# Patient Record
Sex: FEMALE | Race: WHITE | NOT HISPANIC OR LATINO | Employment: OTHER | URBAN - NONMETROPOLITAN AREA
[De-identification: names, ages, dates, MRNs, and addresses within clinical notes are randomized per-mention and may not be internally consistent; named-entity substitution may affect disease eponyms.]

---

## 2023-03-15 PROBLEM — J45.30 MILD PERSISTENT ASTHMA WITHOUT COMPLICATION (HHS-HCC): Status: ACTIVE | Noted: 2023-03-15

## 2023-03-15 PROBLEM — R10.9 ABDOMINAL PAIN: Status: ACTIVE | Noted: 2023-03-15

## 2023-03-15 PROBLEM — J18.9 PNEUMONIA: Status: ACTIVE | Noted: 2023-03-15

## 2023-03-15 PROBLEM — K44.9 HIATAL HERNIA: Status: ACTIVE | Noted: 2023-03-15

## 2023-03-15 PROBLEM — S22.39XA RIB FRACTURE: Status: ACTIVE | Noted: 2023-03-15

## 2023-03-15 PROBLEM — E87.1 HYPONATREMIA: Status: ACTIVE | Noted: 2023-03-15

## 2023-03-15 PROBLEM — K63.5 COLON POLYPS: Status: ACTIVE | Noted: 2023-03-15

## 2023-03-15 PROBLEM — M67.441 GANGLION, FINGER OF RIGHT HAND: Status: ACTIVE | Noted: 2023-03-15

## 2023-03-15 PROBLEM — R53.83 FATIGUE: Status: ACTIVE | Noted: 2023-03-15

## 2023-03-15 PROBLEM — M67.431 GANGLION OF RIGHT WRIST: Status: ACTIVE | Noted: 2023-03-15

## 2023-03-15 PROBLEM — U07.1 COVID-19: Status: ACTIVE | Noted: 2023-03-15

## 2023-03-15 PROBLEM — R10.32 LEFT LOWER QUADRANT ABDOMINAL PAIN: Status: ACTIVE | Noted: 2023-03-15

## 2023-03-15 PROBLEM — M25.531 RIGHT WRIST PAIN: Status: ACTIVE | Noted: 2023-03-15

## 2023-03-15 PROBLEM — R00.2 HEART PALPITATIONS: Status: ACTIVE | Noted: 2023-03-15

## 2023-03-15 PROBLEM — D64.9 ANEMIA: Status: ACTIVE | Noted: 2023-03-15

## 2023-03-15 PROBLEM — R05.9 COUGH: Status: ACTIVE | Noted: 2023-03-15

## 2023-03-15 PROBLEM — R06.02 SOB (SHORTNESS OF BREATH) ON EXERTION: Status: ACTIVE | Noted: 2023-03-15

## 2023-03-15 PROBLEM — R20.0 NUMBNESS: Status: ACTIVE | Noted: 2023-03-15

## 2023-03-15 RX ORDER — CHOLECALCIFEROL (VITAMIN D3) 25 MCG
2 TABLET ORAL DAILY
COMMUNITY
Start: 2021-09-16 | End: 2024-03-01 | Stop reason: ALTCHOICE

## 2023-03-15 RX ORDER — BECLOMETHASONE DIPROPIONATE HFA 80 UG/1
80 AEROSOL, METERED RESPIRATORY (INHALATION)
COMMUNITY
Start: 2022-09-27 | End: 2024-03-01 | Stop reason: ALTCHOICE

## 2023-03-15 RX ORDER — FLUTICASONE PROPIONATE 110 UG/1
2 AEROSOL, METERED RESPIRATORY (INHALATION) 2 TIMES DAILY
COMMUNITY
Start: 2022-09-27 | End: 2024-03-01 | Stop reason: ALTCHOICE

## 2023-03-15 RX ORDER — ALBUTEROL SULFATE 90 UG/1
1 AEROSOL, METERED RESPIRATORY (INHALATION) 2 TIMES DAILY
COMMUNITY
Start: 2021-08-31 | End: 2024-03-01 | Stop reason: ALTCHOICE

## 2023-03-21 ENCOUNTER — OFFICE VISIT (OUTPATIENT)
Dept: PRIMARY CARE | Facility: CLINIC | Age: 68
End: 2023-03-21
Payer: COMMERCIAL

## 2023-03-21 ENCOUNTER — TELEPHONE (OUTPATIENT)
Dept: PRIMARY CARE | Facility: CLINIC | Age: 68
End: 2023-03-21

## 2023-03-21 VITALS
WEIGHT: 230.6 LBS | HEART RATE: 116 BPM | SYSTOLIC BLOOD PRESSURE: 152 MMHG | DIASTOLIC BLOOD PRESSURE: 86 MMHG | BODY MASS INDEX: 39.37 KG/M2 | HEIGHT: 64 IN | OXYGEN SATURATION: 98 %

## 2023-03-21 DIAGNOSIS — N30.00 ACUTE CYSTITIS WITHOUT HEMATURIA: ICD-10-CM

## 2023-03-21 DIAGNOSIS — N94.9 ADNEXAL CYST: Primary | ICD-10-CM

## 2023-03-21 LAB
POC APPEARANCE, URINE: CLEAR
POC BILIRUBIN, URINE: NEGATIVE
POC BLOOD, URINE: NEGATIVE
POC COLOR, URINE: YELLOW
POC GLUCOSE, URINE: NEGATIVE MG/DL
POC KETONES, URINE: NEGATIVE MG/DL
POC LEUKOCYTES, URINE: ABNORMAL
POC NITRITE,URINE: NEGATIVE
POC PH, URINE: 6 PH
POC PROTEIN, URINE: NEGATIVE MG/DL
POC SPECIFIC GRAVITY, URINE: 1.02
POC UROBILINOGEN, URINE: 0.2 EU/DL

## 2023-03-21 PROCEDURE — 99214 OFFICE O/P EST MOD 30 MIN: CPT | Performed by: FAMILY MEDICINE

## 2023-03-21 PROCEDURE — 1159F MED LIST DOCD IN RCRD: CPT | Performed by: FAMILY MEDICINE

## 2023-03-21 PROCEDURE — 1036F TOBACCO NON-USER: CPT | Performed by: FAMILY MEDICINE

## 2023-03-21 PROCEDURE — 81003 URINALYSIS AUTO W/O SCOPE: CPT | Performed by: FAMILY MEDICINE

## 2023-03-21 RX ORDER — SULFAMETHOXAZOLE AND TRIMETHOPRIM 800; 160 MG/1; MG/1
1 TABLET ORAL 2 TIMES DAILY
Qty: 14 TABLET | Refills: 0 | Status: SHIPPED | OUTPATIENT
Start: 2023-03-21 | End: 2023-03-28

## 2023-03-21 ASSESSMENT — ENCOUNTER SYMPTOMS
DIFFICULTY URINATING: 0
CONSTIPATION: 0
ABDOMINAL PAIN: 0
NAUSEA: 0
FREQUENCY: 1
HEMATURIA: 0
FATIGUE: 0
DYSURIA: 0
DIARRHEA: 0

## 2023-03-21 ASSESSMENT — PATIENT HEALTH QUESTIONNAIRE - PHQ9
SUM OF ALL RESPONSES TO PHQ9 QUESTIONS 1 AND 2: 0
2. FEELING DOWN, DEPRESSED OR HOPELESS: NOT AT ALL
1. LITTLE INTEREST OR PLEASURE IN DOING THINGS: NOT AT ALL

## 2023-03-21 NOTE — PROGRESS NOTES
"Subjective   Patient ID: Eloisa Boogie is a 67 y.o. female who presents for Chronic Kidney Disease (F/U).    HPI   She had urinary frequency and burning and went to the ER.  At that time they were worried about kidney stone and did a CT.  CT did not show any kidney stone, but multiple cysts on both kidneys, changes to the left adnexal area, hiatal hernia, diverticular disease without diverticulitis, possible epiploic appendagitis.  Since the ER she is feeling better.    Remotely had a hysterectomy and her right ovary removed.    Urinalysis today - no blood, small leuks.    Still with some urinary frequency will add Bactrim for 1 week.    We will order a pelvic ultrasound for the changes to the left adnexal area.  Recommend she follow-up with her GYN doctor for regular checkup the adnexal changes.  She is going to call the GYN office for visit.    Review of Systems   Constitutional:  Negative for fatigue.   Gastrointestinal:  Negative for abdominal pain, constipation, diarrhea and nausea.   Genitourinary:  Positive for frequency. Negative for difficulty urinating, dysuria and hematuria.       Objective   /86   Pulse (!) 116   Ht 1.626 m (5' 4\")   Wt 105 kg (230 lb 9.6 oz)   LMP  (LMP Unknown)   SpO2 98%   BMI 39.58 kg/m²     Physical Exam  General:  Alert and oriented, No acute distress.         Ambulation status: With steady gait.         Appearance: Well nourished, Calm.         Behavior: Cooperative.    Integumentary:  Warm, Dry, Pink, Intact.    Psychiatric:  Cooperative, Appropriate mood & affect, Normal judgment.      Assessment/Plan   Problem List Items Addressed This Visit          Genitourinary    Adnexal cyst - Primary    Relevant Orders    US pelvis     Other Visit Diagnoses       Acute cystitis without hematuria        Relevant Medications    sulfamethoxazole-trimethoprim (Bactrim DS) 800-160 mg tablet    Other Relevant Orders    POCT UA Automated manually resulted               "

## 2023-03-21 NOTE — TELEPHONE ENCOUNTER
SCHEDULED US FOR PELVIS FOR 3/28/23 AT [facility] ARRIVAL AT 9:30.  DRINK 32 OZ WATER 1 HR PRIOR TO PROCEDURE AND DON'T URINATE. PATIENT ADVISED.

## 2023-03-28 ENCOUNTER — TELEPHONE (OUTPATIENT)
Dept: PRIMARY CARE | Facility: CLINIC | Age: 68
End: 2023-03-28
Payer: COMMERCIAL

## 2023-03-28 NOTE — TELEPHONE ENCOUNTER
----- Message from Jose Taylor MD sent at 3/28/2023  4:38 PM EDT -----  Notify her that the pelvic ultrasound shows she has a cyst on the ovary.  I still recommend she follow-up with the GYN doctor about the cyst and for a general check up.

## 2023-03-28 NOTE — RESULT ENCOUNTER NOTE
Notify her that the pelvic ultrasound shows she has a cyst on the ovary.  I still recommend she follow-up with the GYN doctor about the cyst and for a general check up.

## 2023-08-07 ENCOUNTER — APPOINTMENT (OUTPATIENT)
Dept: PRIMARY CARE | Facility: CLINIC | Age: 68
End: 2023-08-07
Payer: COMMERCIAL

## 2023-09-05 PROBLEM — H25.811 COMBINED FORMS OF AGE-RELATED CATARACT OF RIGHT EYE: Status: ACTIVE | Noted: 2020-01-03

## 2023-09-05 PROBLEM — H25.812 COMBINED FORM OF AGE-RELATED CATARACT, LEFT EYE: Status: ACTIVE | Noted: 2020-01-03

## 2023-09-05 PROBLEM — Z87.42 HISTORY OF OVARIAN CYST: Status: ACTIVE | Noted: 2023-09-05

## 2023-09-05 PROBLEM — M67.431 GANGLION OF RIGHT WRIST: Status: ACTIVE | Noted: 2023-09-05

## 2023-09-05 PROBLEM — M79.606 LEG PAIN: Status: ACTIVE | Noted: 2023-09-05

## 2023-09-05 PROBLEM — H04.123 DRY EYE SYNDROME OF BOTH EYES: Status: ACTIVE | Noted: 2020-01-03

## 2023-09-05 PROBLEM — M67.449 GANGLION CYST OF FINGER: Status: ACTIVE | Noted: 2023-09-05

## 2023-09-05 PROBLEM — H35.3131 EARLY DRY STAGE NONEXUDATIVE AGE-RELATED MACULAR DEGENERATION OF BOTH EYES: Status: ACTIVE | Noted: 2020-01-03

## 2023-09-05 RX ORDER — PHENAZOPYRIDINE HYDROCHLORIDE 100 MG/1
1 TABLET, FILM COATED ORAL 3 TIMES DAILY
COMMUNITY
Start: 2023-03-12 | End: 2024-03-01 | Stop reason: ALTCHOICE

## 2023-09-05 RX ORDER — OXYCODONE HYDROCHLORIDE 5 MG/1
1 TABLET ORAL EVERY 6 HOURS PRN
COMMUNITY
Start: 2022-09-21 | End: 2024-03-01 | Stop reason: ALTCHOICE

## 2023-09-05 RX ORDER — ONDANSETRON 4 MG/1
4 TABLET, ORALLY DISINTEGRATING ORAL EVERY 6 HOURS PRN
COMMUNITY
Start: 2023-03-12 | End: 2024-03-01 | Stop reason: ALTCHOICE

## 2023-09-05 RX ORDER — ACETAMINOPHEN 325 MG/1
325-650 TABLET ORAL EVERY 4 HOURS PRN
COMMUNITY
End: 2024-03-01 | Stop reason: ALTCHOICE

## 2023-09-05 RX ORDER — ONDANSETRON HYDROCHLORIDE 8 MG/1
4 TABLET, FILM COATED ORAL EVERY 8 HOURS PRN
COMMUNITY
Start: 2021-08-30 | End: 2024-03-01 | Stop reason: ALTCHOICE

## 2023-09-05 RX ORDER — IBUPROFEN 200 MG
1-2 TABLET ORAL EVERY 6 HOURS PRN
COMMUNITY
Start: 2013-12-03 | End: 2024-03-01 | Stop reason: ALTCHOICE

## 2023-09-05 RX ORDER — ALBUTEROL SULFATE 1.25 MG/3ML
3 SOLUTION RESPIRATORY (INHALATION) EVERY 6 HOURS PRN
COMMUNITY
Start: 2021-09-16 | End: 2024-03-01 | Stop reason: ALTCHOICE

## 2023-09-08 ENCOUNTER — CLINICAL SUPPORT (OUTPATIENT)
Dept: PRIMARY CARE | Facility: CLINIC | Age: 68
End: 2023-09-08
Payer: COMMERCIAL

## 2023-09-08 DIAGNOSIS — R30.0 DYSURIA: ICD-10-CM

## 2023-09-08 DIAGNOSIS — N30.00 ACUTE CYSTITIS WITHOUT HEMATURIA: Primary | ICD-10-CM

## 2023-09-08 LAB
POC APPEARANCE, URINE: CLEAR
POC BILIRUBIN, URINE: NEGATIVE
POC BLOOD, URINE: NEGATIVE
POC COLOR, URINE: YELLOW
POC GLUCOSE, URINE: NEGATIVE MG/DL
POC KETONES, URINE: NEGATIVE MG/DL
POC LEUKOCYTES, URINE: NEGATIVE
POC NITRITE,URINE: NEGATIVE
POC PH, URINE: 6 PH
POC PROTEIN, URINE: NEGATIVE MG/DL
POC SPECIFIC GRAVITY, URINE: 1.02
POC UROBILINOGEN, URINE: 0.2 EU/DL

## 2023-09-08 PROCEDURE — 81003 URINALYSIS AUTO W/O SCOPE: CPT | Performed by: FAMILY MEDICINE

## 2023-09-08 RX ORDER — NITROFURANTOIN 25; 75 MG/1; MG/1
100 CAPSULE ORAL 2 TIMES DAILY
Qty: 10 CAPSULE | Refills: 1 | Status: SHIPPED | OUTPATIENT
Start: 2023-09-08 | End: 2023-09-13

## 2023-09-08 NOTE — PROGRESS NOTES
Patient brought a urine sample into the office today for dysuria. Jesusita ran urine test strip for POCT UA. Results forwarded to PCP.

## 2023-09-11 DIAGNOSIS — J45.30 MILD PERSISTENT ASTHMA WITHOUT COMPLICATION (HHS-HCC): Primary | ICD-10-CM

## 2023-09-11 RX ORDER — ALBUTEROL SULFATE 90 UG/1
2 AEROSOL, METERED RESPIRATORY (INHALATION) EVERY 6 HOURS PRN
Qty: 54 G | Refills: 3 | Status: SHIPPED | OUTPATIENT
Start: 2023-09-11 | End: 2024-09-10

## 2023-09-11 RX ORDER — ALBUTEROL SULFATE 90 UG/1
2 AEROSOL, METERED RESPIRATORY (INHALATION) EVERY 6 HOURS PRN
COMMUNITY
End: 2023-09-11 | Stop reason: SDUPTHER

## 2023-09-11 RX ORDER — ALBUTEROL SULFATE 90 UG/1
2 AEROSOL, METERED RESPIRATORY (INHALATION) EVERY 6 HOURS PRN
COMMUNITY
End: 2024-03-01 | Stop reason: ALTCHOICE

## 2023-09-12 ENCOUNTER — TELEPHONE (OUTPATIENT)
Dept: PRIMARY CARE | Facility: CLINIC | Age: 68
End: 2023-09-12
Payer: COMMERCIAL

## 2023-09-12 DIAGNOSIS — U07.1 COVID-19: Primary | ICD-10-CM

## 2023-09-12 RX ORDER — NIRMATRELVIR AND RITONAVIR 300-100 MG
3 KIT ORAL 2 TIMES DAILY
Qty: 5 DOSE PACK | Refills: 0 | Status: SHIPPED | OUTPATIENT
Start: 2023-09-12 | End: 2023-09-17

## 2023-09-12 NOTE — TELEPHONE ENCOUNTER
PATIENT CALLED YESTERDAY, STARTED WITH CO VID SYMPTOMS SATURDAY/   TESTED POSITIVE 9-11-23.   WOULD LIKE [facility]OVID SENT TO [facility].   PLEASE

## 2023-09-19 ENCOUNTER — APPOINTMENT (OUTPATIENT)
Dept: PRIMARY CARE | Facility: CLINIC | Age: 68
End: 2023-09-19
Payer: COMMERCIAL

## 2023-10-04 ENCOUNTER — HOSPITAL ENCOUNTER (OUTPATIENT)
Dept: RADIOLOGY | Facility: HOSPITAL | Age: 68
Discharge: HOME | End: 2023-10-04
Payer: COMMERCIAL

## 2023-10-04 DIAGNOSIS — Z87.42 PERSONAL HISTORY OF OTHER DISEASES OF THE FEMALE GENITAL TRACT: ICD-10-CM

## 2023-10-04 PROCEDURE — 76830 TRANSVAGINAL US NON-OB: CPT

## 2023-10-04 PROCEDURE — 76856 US EXAM PELVIC COMPLETE: CPT

## 2023-10-04 PROCEDURE — 76856 US EXAM PELVIC COMPLETE: CPT | Performed by: RADIOLOGY

## 2023-10-04 PROCEDURE — 76830 TRANSVAGINAL US NON-OB: CPT | Performed by: RADIOLOGY

## 2023-10-10 DIAGNOSIS — N83.209 CYST OF OVARY, UNSPECIFIED LATERALITY: Primary | ICD-10-CM

## 2023-10-11 ENCOUNTER — APPOINTMENT (OUTPATIENT)
Dept: OBSTETRICS AND GYNECOLOGY | Facility: CLINIC | Age: 68
End: 2023-10-11
Payer: COMMERCIAL

## 2024-03-01 ENCOUNTER — LAB (OUTPATIENT)
Dept: LAB | Facility: LAB | Age: 69
End: 2024-03-01
Payer: COMMERCIAL

## 2024-03-01 ENCOUNTER — OFFICE VISIT (OUTPATIENT)
Dept: PRIMARY CARE | Facility: CLINIC | Age: 69
End: 2024-03-01
Payer: COMMERCIAL

## 2024-03-01 VITALS
HEIGHT: 64 IN | WEIGHT: 235.8 LBS | OXYGEN SATURATION: 99 % | BODY MASS INDEX: 40.26 KG/M2 | HEART RATE: 109 BPM | SYSTOLIC BLOOD PRESSURE: 138 MMHG | DIASTOLIC BLOOD PRESSURE: 94 MMHG

## 2024-03-01 DIAGNOSIS — J45.30 MILD PERSISTENT ASTHMA WITHOUT COMPLICATION (HHS-HCC): ICD-10-CM

## 2024-03-01 DIAGNOSIS — Z00.00 ROUTINE GENERAL MEDICAL EXAMINATION AT HEALTH CARE FACILITY: Primary | ICD-10-CM

## 2024-03-01 DIAGNOSIS — D50.8 OTHER IRON DEFICIENCY ANEMIA: ICD-10-CM

## 2024-03-01 PROBLEM — R53.83 FATIGUE: Status: RESOLVED | Noted: 2023-03-15 | Resolved: 2024-03-01

## 2024-03-01 PROBLEM — R10.32 LEFT LOWER QUADRANT ABDOMINAL PAIN: Status: RESOLVED | Noted: 2023-03-15 | Resolved: 2024-03-01

## 2024-03-01 PROBLEM — R00.2 HEART PALPITATIONS: Status: RESOLVED | Noted: 2023-03-15 | Resolved: 2024-03-01

## 2024-03-01 PROBLEM — R20.0 NUMBNESS: Status: RESOLVED | Noted: 2023-03-15 | Resolved: 2024-03-01

## 2024-03-01 PROBLEM — J18.9 PNEUMONIA: Status: RESOLVED | Noted: 2023-03-15 | Resolved: 2024-03-01

## 2024-03-01 PROBLEM — R06.02 SOB (SHORTNESS OF BREATH) ON EXERTION: Status: RESOLVED | Noted: 2023-03-15 | Resolved: 2024-03-01

## 2024-03-01 PROBLEM — U07.1 COVID-19: Status: RESOLVED | Noted: 2023-03-15 | Resolved: 2024-03-01

## 2024-03-01 PROBLEM — R10.9 ABDOMINAL PAIN: Status: RESOLVED | Noted: 2023-03-15 | Resolved: 2024-03-01

## 2024-03-01 PROBLEM — E87.1 HYPONATREMIA: Status: RESOLVED | Noted: 2023-03-15 | Resolved: 2024-03-01

## 2024-03-01 PROBLEM — R05.9 COUGH: Status: RESOLVED | Noted: 2023-03-15 | Resolved: 2024-03-01

## 2024-03-01 LAB
ALBUMIN SERPL BCP-MCNC: 4.1 G/DL (ref 3.4–5)
ALP SERPL-CCNC: 86 U/L (ref 33–136)
ALT SERPL W P-5'-P-CCNC: 13 U/L (ref 7–45)
ANION GAP SERPL CALC-SCNC: 12 MMOL/L (ref 10–20)
AST SERPL W P-5'-P-CCNC: 15 U/L (ref 9–39)
BILIRUB SERPL-MCNC: 0.3 MG/DL (ref 0–1.2)
BUN SERPL-MCNC: 17 MG/DL (ref 6–23)
CALCIUM SERPL-MCNC: 10.1 MG/DL (ref 8.6–10.3)
CHLORIDE SERPL-SCNC: 107 MMOL/L (ref 98–107)
CO2 SERPL-SCNC: 27 MMOL/L (ref 21–32)
CREAT SERPL-MCNC: 0.63 MG/DL (ref 0.5–1.05)
EGFRCR SERPLBLD CKD-EPI 2021: >90 ML/MIN/1.73M*2
ERYTHROCYTE [DISTWIDTH] IN BLOOD BY AUTOMATED COUNT: 18.6 % (ref 11.5–14.5)
FERRITIN SERPL-MCNC: 24 NG/ML (ref 8–150)
GLUCOSE SERPL-MCNC: 100 MG/DL (ref 74–99)
HCT VFR BLD AUTO: 34.5 % (ref 36–46)
HGB BLD-MCNC: 9.7 G/DL (ref 12–16)
IRON SATN MFR SERPL: 4 % (ref 25–45)
IRON SERPL-MCNC: 15 UG/DL (ref 35–150)
MCH RBC QN AUTO: 21.8 PG (ref 26–34)
MCHC RBC AUTO-ENTMCNC: 28.1 G/DL (ref 32–36)
MCV RBC AUTO: 78 FL (ref 80–100)
NRBC BLD-RTO: 0 /100 WBCS (ref 0–0)
PLATELET # BLD AUTO: 344 X10*3/UL (ref 150–450)
POTASSIUM SERPL-SCNC: 4.7 MMOL/L (ref 3.5–5.3)
PROT SERPL-MCNC: 6.6 G/DL (ref 6.4–8.2)
RBC # BLD AUTO: 4.45 X10*6/UL (ref 4–5.2)
SODIUM SERPL-SCNC: 141 MMOL/L (ref 136–145)
TIBC SERPL-MCNC: 428 UG/DL (ref 240–445)
UIBC SERPL-MCNC: 413 UG/DL (ref 110–370)
VIT B12 SERPL-MCNC: 849 PG/ML (ref 211–911)
WBC # BLD AUTO: 9 X10*3/UL (ref 4.4–11.3)

## 2024-03-01 PROCEDURE — 1160F RVW MEDS BY RX/DR IN RCRD: CPT | Performed by: FAMILY MEDICINE

## 2024-03-01 PROCEDURE — 82728 ASSAY OF FERRITIN: CPT

## 2024-03-01 PROCEDURE — 36415 COLL VENOUS BLD VENIPUNCTURE: CPT

## 2024-03-01 PROCEDURE — G0439 PPPS, SUBSEQ VISIT: HCPCS | Performed by: FAMILY MEDICINE

## 2024-03-01 PROCEDURE — 90677 PCV20 VACCINE IM: CPT | Performed by: FAMILY MEDICINE

## 2024-03-01 PROCEDURE — 80053 COMPREHEN METABOLIC PANEL: CPT

## 2024-03-01 PROCEDURE — 99214 OFFICE O/P EST MOD 30 MIN: CPT | Performed by: FAMILY MEDICINE

## 2024-03-01 PROCEDURE — 82607 VITAMIN B-12: CPT

## 2024-03-01 PROCEDURE — G0009 ADMIN PNEUMOCOCCAL VACCINE: HCPCS | Performed by: FAMILY MEDICINE

## 2024-03-01 PROCEDURE — 1159F MED LIST DOCD IN RCRD: CPT | Performed by: FAMILY MEDICINE

## 2024-03-01 PROCEDURE — 83540 ASSAY OF IRON: CPT

## 2024-03-01 PROCEDURE — 1170F FXNL STATUS ASSESSED: CPT | Performed by: FAMILY MEDICINE

## 2024-03-01 PROCEDURE — 1036F TOBACCO NON-USER: CPT | Performed by: FAMILY MEDICINE

## 2024-03-01 PROCEDURE — 85027 COMPLETE CBC AUTOMATED: CPT

## 2024-03-01 PROCEDURE — 83550 IRON BINDING TEST: CPT

## 2024-03-01 RX ORDER — FLUTICASONE FUROATE 200 UG/1
1 POWDER RESPIRATORY (INHALATION) DAILY
Qty: 1 EACH | Refills: 11 | Status: SHIPPED | OUTPATIENT
Start: 2024-03-01 | End: 2024-03-28 | Stop reason: ALTCHOICE

## 2024-03-01 RX ORDER — FLUTICASONE PROPIONATE 110 UG/1
2 AEROSOL, METERED RESPIRATORY (INHALATION) 2 TIMES DAILY
Qty: 12 G | Refills: 11 | Status: CANCELLED | OUTPATIENT
Start: 2024-03-01 | End: 2025-03-01

## 2024-03-01 ASSESSMENT — ACTIVITIES OF DAILY LIVING (ADL)
TAKING_MEDICATION: INDEPENDENT
DOING_HOUSEWORK: INDEPENDENT
GROCERY_SHOPPING: INDEPENDENT
MANAGING_FINANCES: INDEPENDENT
BATHING: INDEPENDENT
DRESSING: INDEPENDENT

## 2024-03-01 ASSESSMENT — ENCOUNTER SYMPTOMS
HEADACHES: 0
SHORTNESS OF BREATH: 1
CONSTIPATION: 0
LOSS OF SENSATION IN FEET: 0
ABDOMINAL PAIN: 0
COUGH: 1
DIARRHEA: 0
FATIGUE: 0
PALPITATIONS: 1
OCCASIONAL FEELINGS OF UNSTEADINESS: 0
CHEST TIGHTNESS: 0

## 2024-03-01 NOTE — PROGRESS NOTES
Subjective   Reason for Visit: Eloisa Boogie is an 68 y.o. female here for a Medicare Wellness visit.     Past Medical, Surgical, and Family History reviewed and updated in chart.    Reviewed all medications by prescribing practitioner or clinical pharmacist (such as prescriptions, OTCs, herbal therapies and supplements) and documented in the medical record.    HPI  Exam she has multiple mild side effects from combined inhalers like Advair or Symbicort.  Sometimes palpitations sometimes bladder irritation.  But overall she feels like her breathing is getting worse, and she definitely feels better when she uses at least the steroid inhaler.  She is wondering if another medicine besides the albuterol inhaler would help, like the nebulizer solution and DuoNeb.  Worried about those medications being stronger causing her heart palpitations.  According to the computer working to try Flovent but annuity Ellipta is covered better.  So we will start that 1 puff once a day swish and spit swish and swallow.  Continue as needed albuterol  Office visit 6 weeks  Prevnar 20 shot done today.  Recommend yearly flu shot.  Colonoscopy 2022, polyps, 5 years.  Mammogram 2022    Still taking iron, had iron infusions from Dr. Lazcano.  Will follow-up blood test.  Will check a CBC etc. today.    Patient Care Team:  Jose Taylor MD as PCP - General  Jose Taylor MD as PCP - [employer] PCP  Jose Taylor MD as PCP - [employer] PCP     Review of Systems   Constitutional:  Negative for fatigue.   Eyes:  Negative for visual disturbance.   Respiratory:  Positive for cough and shortness of breath. Negative for chest tightness.    Cardiovascular:  Positive for palpitations. Negative for chest pain.   Gastrointestinal:  Negative for abdominal pain, constipation and diarrhea.   Skin:  Negative for rash.   Neurological:  Negative for headaches.       Objective   Vitals:  BP (!) 138/94   Pulse 109  "  Ht 1.626 m (5' 4\")   Wt 107 kg (235 lb 12.8 oz)   LMP  (LMP Unknown)   SpO2 99%   BMI 40.47 kg/m²       Physical Exam  Constitutional:       Appearance: Normal appearance.   HENT:      Head: Normocephalic and atraumatic.   Cardiovascular:      Rate and Rhythm: Normal rate and regular rhythm.      Heart sounds: Normal heart sounds.   Pulmonary:      Effort: Pulmonary effort is normal.      Breath sounds: Decreased air movement present. Wheezing and rhonchi present.   Skin:     General: Skin is warm and dry.   Neurological:      General: No focal deficit present.      Mental Status: She is alert and oriented to person, place, and time.   Psychiatric:         Mood and Affect: Mood normal.         Behavior: Behavior normal.         Thought Content: Thought content normal.         Judgment: Judgment normal.         Assessment/Plan   Problem List Items Addressed This Visit       Anemia    Relevant Orders    CBC    Comprehensive Metabolic Panel    Ferritin    Iron and TIBC    Vitamin B12    Mild persistent asthma without complication    Relevant Medications    fluticasone furoate (Arnuity Ellipta) 200 mcg/actuation inhaler     Other Visit Diagnoses       Routine general medical examination at health care facility    -  Primary                 "

## 2024-03-04 ENCOUNTER — TELEPHONE (OUTPATIENT)
Dept: PRIMARY CARE | Facility: CLINIC | Age: 69
End: 2024-03-04
Payer: COMMERCIAL

## 2024-03-04 DIAGNOSIS — D50.8 OTHER IRON DEFICIENCY ANEMIA: Primary | ICD-10-CM

## 2024-03-04 NOTE — TELEPHONE ENCOUNTER
----- Message from Jose Taylor MD sent at 3/4/2024  8:29 AM EST -----  Basic labs are okay, but she is anemic again.  Since he is taking the iron every day and still anemic, I recommend we will refer her to a blood doctor/hematology.  Is she okay with that?  If yes, we will put the order in.

## 2024-03-04 NOTE — RESULT ENCOUNTER NOTE
Basic labs are okay, but she is anemic again.  Since he is taking the iron every day and still anemic, I recommend we will refer her to a blood doctor/hematology.  Is she okay with that?  If yes, we will put the order in.

## 2024-03-06 NOTE — TELEPHONE ENCOUNTER
PT CALLED IN TO VERIFY WHEN HER APPT IS WITH HEM[facility]ONC WITH [facility] . ADVISED HER OF THAT INFORMATION.

## 2024-03-15 ENCOUNTER — OFFICE VISIT (OUTPATIENT)
Dept: HEMATOLOGY/ONCOLOGY | Facility: CLINIC | Age: 69
End: 2024-03-15
Payer: COMMERCIAL

## 2024-03-15 VITALS
TEMPERATURE: 96.4 F | DIASTOLIC BLOOD PRESSURE: 90 MMHG | HEART RATE: 121 BPM | RESPIRATION RATE: 20 BRPM | BODY MASS INDEX: 41.25 KG/M2 | OXYGEN SATURATION: 98 % | HEIGHT: 64 IN | WEIGHT: 241.6 LBS | SYSTOLIC BLOOD PRESSURE: 140 MMHG

## 2024-03-15 DIAGNOSIS — E61.1 IRON DEFICIENCY: Primary | ICD-10-CM

## 2024-03-15 DIAGNOSIS — D50.8 OTHER IRON DEFICIENCY ANEMIA: ICD-10-CM

## 2024-03-15 LAB
ALBUMIN SERPL BCP-MCNC: 3.8 G/DL (ref 3.4–5)
ALP SERPL-CCNC: 88 U/L (ref 33–136)
ALT SERPL W P-5'-P-CCNC: 15 U/L (ref 7–45)
ANION GAP SERPL CALC-SCNC: 10 MMOL/L (ref 10–20)
AST SERPL W P-5'-P-CCNC: 14 U/L (ref 9–39)
BASOPHILS # BLD AUTO: 0.07 X10*3/UL (ref 0–0.1)
BASOPHILS NFR BLD AUTO: 0.7 %
BILIRUB SERPL-MCNC: 0.2 MG/DL (ref 0–1.2)
BUN SERPL-MCNC: 19 MG/DL (ref 6–23)
CALCIUM SERPL-MCNC: 9.9 MG/DL (ref 8.6–10.3)
CHLORIDE SERPL-SCNC: 108 MMOL/L (ref 98–107)
CO2 SERPL-SCNC: 25 MMOL/L (ref 21–32)
CREAT SERPL-MCNC: 0.67 MG/DL (ref 0.5–1.05)
EGFRCR SERPLBLD CKD-EPI 2021: >90 ML/MIN/1.73M*2
EOSINOPHIL # BLD AUTO: 0.13 X10*3/UL (ref 0–0.7)
EOSINOPHIL NFR BLD AUTO: 1.3 %
ERYTHROCYTE [DISTWIDTH] IN BLOOD BY AUTOMATED COUNT: 19.5 % (ref 11.5–14.5)
FERRITIN SERPL-MCNC: 21 NG/ML (ref 8–150)
FOLATE SERPL-MCNC: 13.1 NG/ML
GLUCOSE SERPL-MCNC: 154 MG/DL (ref 74–99)
HCT VFR BLD AUTO: 32.6 % (ref 36–46)
HGB BLD-MCNC: 9.4 G/DL (ref 12–16)
HGB RETIC QN: 27 PG (ref 28–38)
IMM GRANULOCYTES # BLD AUTO: 0.05 X10*3/UL (ref 0–0.7)
IMM GRANULOCYTES NFR BLD AUTO: 0.5 % (ref 0–0.9)
IMMATURE RETIC FRACTION: 40.3 %
IRON SATN MFR SERPL: 3 % (ref 25–45)
IRON SERPL-MCNC: 15 UG/DL (ref 35–150)
LDH SERPL L TO P-CCNC: 136 U/L (ref 84–246)
LYMPHOCYTES # BLD AUTO: 2.17 X10*3/UL (ref 1.2–4.8)
LYMPHOCYTES NFR BLD AUTO: 21.5 %
MCH RBC QN AUTO: 22 PG (ref 26–34)
MCHC RBC AUTO-ENTMCNC: 28.8 G/DL (ref 32–36)
MCV RBC AUTO: 76 FL (ref 80–100)
MONOCYTES # BLD AUTO: 0.78 X10*3/UL (ref 0.1–1)
MONOCYTES NFR BLD AUTO: 7.7 %
NEUTROPHILS # BLD AUTO: 6.91 X10*3/UL (ref 1.2–7.7)
NEUTROPHILS NFR BLD AUTO: 68.3 %
NRBC BLD-RTO: 0 /100 WBCS (ref 0–0)
PLATELET # BLD AUTO: 317 X10*3/UL (ref 150–450)
POTASSIUM SERPL-SCNC: 3.9 MMOL/L (ref 3.5–5.3)
PROT SERPL-MCNC: 6.4 G/DL (ref 6.4–8.2)
RBC # BLD AUTO: 4.28 X10*6/UL (ref 4–5.2)
RETICS #: 0.09 X10*6/UL (ref 0.02–0.11)
RETICS/RBC NFR AUTO: 2.2 % (ref 0.5–2)
SODIUM SERPL-SCNC: 139 MMOL/L (ref 136–145)
TIBC SERPL-MCNC: 455 UG/DL (ref 240–445)
TSH SERPL-ACNC: 1.64 MIU/L (ref 0.44–3.98)
UIBC SERPL-MCNC: 440 UG/DL (ref 110–370)
VIT B12 SERPL-MCNC: 884 PG/ML (ref 211–911)
WBC # BLD AUTO: 10.1 X10*3/UL (ref 4.4–11.3)

## 2024-03-15 PROCEDURE — 82746 ASSAY OF FOLIC ACID SERUM: CPT

## 2024-03-15 PROCEDURE — 1159F MED LIST DOCD IN RCRD: CPT

## 2024-03-15 PROCEDURE — 82728 ASSAY OF FERRITIN: CPT

## 2024-03-15 PROCEDURE — 84443 ASSAY THYROID STIM HORMONE: CPT

## 2024-03-15 PROCEDURE — 85045 AUTOMATED RETICULOCYTE COUNT: CPT

## 2024-03-15 PROCEDURE — 1160F RVW MEDS BY RX/DR IN RCRD: CPT

## 2024-03-15 PROCEDURE — 1126F AMNT PAIN NOTED NONE PRSNT: CPT

## 2024-03-15 PROCEDURE — 99205 OFFICE O/P NEW HI 60 MIN: CPT

## 2024-03-15 PROCEDURE — 36415 COLL VENOUS BLD VENIPUNCTURE: CPT

## 2024-03-15 PROCEDURE — 83010 ASSAY OF HAPTOGLOBIN QUANT: CPT

## 2024-03-15 PROCEDURE — 82607 VITAMIN B-12: CPT

## 2024-03-15 PROCEDURE — 83615 LACTATE (LD) (LDH) ENZYME: CPT

## 2024-03-15 PROCEDURE — 80053 COMPREHEN METABOLIC PANEL: CPT

## 2024-03-15 PROCEDURE — 99215 OFFICE O/P EST HI 40 MIN: CPT

## 2024-03-15 PROCEDURE — 83540 ASSAY OF IRON: CPT

## 2024-03-15 PROCEDURE — 85025 COMPLETE CBC W/AUTO DIFF WBC: CPT

## 2024-03-15 RX ORDER — HEPARIN SODIUM,PORCINE/PF 10 UNIT/ML
50 SYRINGE (ML) INTRAVENOUS AS NEEDED
Status: CANCELLED | OUTPATIENT
Start: 2024-03-15

## 2024-03-15 RX ORDER — LANOLIN ALCOHOL/MO/W.PET/CERES
250 CREAM (GRAM) TOPICAL DAILY
COMMUNITY

## 2024-03-15 RX ORDER — DIPHENHYDRAMINE HYDROCHLORIDE 50 MG/ML
50 INJECTION INTRAMUSCULAR; INTRAVENOUS AS NEEDED
Status: CANCELLED | OUTPATIENT
Start: 2024-03-15

## 2024-03-15 RX ORDER — HEPARIN 100 UNIT/ML
500 SYRINGE INTRAVENOUS AS NEEDED
Status: CANCELLED | OUTPATIENT
Start: 2024-03-15

## 2024-03-15 RX ORDER — FAMOTIDINE 10 MG/ML
20 INJECTION INTRAVENOUS ONCE AS NEEDED
Status: CANCELLED | OUTPATIENT
Start: 2024-03-15

## 2024-03-15 RX ORDER — ALBUTEROL SULFATE 0.83 MG/ML
3 SOLUTION RESPIRATORY (INHALATION) AS NEEDED
Status: CANCELLED | OUTPATIENT
Start: 2024-03-15

## 2024-03-15 RX ORDER — EPINEPHRINE 0.3 MG/.3ML
0.3 INJECTION SUBCUTANEOUS EVERY 5 MIN PRN
Status: CANCELLED | OUTPATIENT
Start: 2024-03-15

## 2024-03-15 ASSESSMENT — COLUMBIA-SUICIDE SEVERITY RATING SCALE - C-SSRS
2. HAVE YOU ACTUALLY HAD ANY THOUGHTS OF KILLING YOURSELF?: NO
6. HAVE YOU EVER DONE ANYTHING, STARTED TO DO ANYTHING, OR PREPARED TO DO ANYTHING TO END YOUR LIFE?: NO
1. IN THE PAST MONTH, HAVE YOU WISHED YOU WERE DEAD OR WISHED YOU COULD GO TO SLEEP AND NOT WAKE UP?: NO

## 2024-03-15 ASSESSMENT — PATIENT HEALTH QUESTIONNAIRE - PHQ9
SUM OF ALL RESPONSES TO PHQ9 QUESTIONS 1 AND 2: 0
1. LITTLE INTEREST OR PLEASURE IN DOING THINGS: NOT AT ALL
2. FEELING DOWN, DEPRESSED OR HOPELESS: NOT AT ALL

## 2024-03-15 ASSESSMENT — ENCOUNTER SYMPTOMS
DEPRESSION: 0
LOSS OF SENSATION IN FEET: 0
OCCASIONAL FEELINGS OF UNSTEADINESS: 0

## 2024-03-15 ASSESSMENT — PAIN SCALES - GENERAL: PAINLEVEL: 0-NO PAIN

## 2024-03-15 NOTE — PROGRESS NOTES
Patient ID: Eloisa Boogie is a 68 y.o. female.  Referring Physician: Jose Taylor MD  [address] [id number]  Primary Care Provider: Jose Taylor MD  Visit Type: Initial Visit      Subjective    HPI    Patient is a 69 yo female with a PMH of  and was referred to benign hematology for consultation of Anemia.    Recent lab work revealed her hg remains low. Patient reports she has struggle with anemia and iron deficiency since her hip replacement in 2016. Patient had a colonoscopy in 2023 to rule out GI bleeding. Coloscopy revealed two polyps, otherwise no significant findings. GI ordered iron infusions, and then started her on oral iron and B12 in hopes to manage iron parameters.      Today, patient presents for initial consultation. Appetite is decent for patient, 1-2 meals on average. Energy is poor/fatigues easily. Since having COVID in 2022  has been struggling with rapid heart beat and increased VELASCO.No abnormal bleeding or bruising. No GI issues. Denies any hematochezia or melena. No urinary issues, no UTIs, dysuria, hematuria. No fevers, night sweats, weight is stable. No recurring infections. No bone pain. Episodes of lightheadedness, and dizziness. Great dentition, left raised rash near elbow, dry skin and hair. No PICA.       up to date on cancer screenings    SH: Diet - reg, Tobacco-None, ETOH-None, Rec drug use-None, Worked [employer], 2 adult children   PSH:   PAT, Several orthopedic surgeries, Left hip replacement, Left arm pins and plates  PMH: Asthma  FH:  Mother- liver cancer  Father- Hodkins lymphoma  Sister- Met. Breast Cancer- late 50's    Meds: see list   Review of Systems   Constitutional:  Positive for fatigue.   HENT:  Negative.     Eyes: Negative.    Respiratory:  Positive for shortness of breath.    Cardiovascular:  Positive for palpitations.   Gastrointestinal: Negative.    Genitourinary: Negative.     Musculoskeletal: Negative.    Skin: Negative.    Neurological:   Positive for dizziness and light-headedness.   Hematological:  Bruises/bleeds easily.   Psychiatric/Behavioral: Negative.          Objective   BSA: There is no height or weight on file to calculate BSA.  LMP  (LMP Unknown)      has a past medical history of Personal history of other medical treatment (04/19/2022).   has a past surgical history that includes Other surgical history (11/12/2020); Other surgical history (11/12/2020); Other surgical history (11/12/2020); Other surgical history (11/12/2020); and Other surgical history (07/22/2022).  Family History   Problem Relation Name Age of Onset    Liver cancer Mother      Cancer Father      Breast cancer Sister      Heart attack Brother       Oncology History    No history exists.       Eloisa Boogie  reports that she has never smoked. She has never used smokeless tobacco.  She  reports no history of alcohol use.  She  reports no history of drug use.    Physical Exam  Vitals and nursing note reviewed.   Constitutional:       Appearance: Normal appearance.   HENT:      Head: Normocephalic and atraumatic.      Mouth/Throat:      Pharynx: Oropharynx is clear.   Eyes:      General: No scleral icterus.     Extraocular Movements: Extraocular movements intact.      Conjunctiva/sclera: Conjunctivae normal.   Cardiovascular:      Rate and Rhythm: Normal rate and regular rhythm.      Pulses: Normal pulses.      Heart sounds: Normal heart sounds. No murmur heard.  Pulmonary:      Breath sounds: Normal breath sounds.   Abdominal:      General: There is no distension.      Palpations: Abdomen is soft. There is no mass.      Tenderness: There is no abdominal tenderness.   Musculoskeletal:         General: Normal range of motion.      Cervical back: Normal range of motion.   Skin:     General: Skin is warm and dry.   Neurological:      General: No focal deficit present.      Mental Status: She is alert and oriented to person, place, and time.   Psychiatric:         Mood and  Affect: Mood normal.         Behavior: Behavior normal.         Thought Content: Thought content normal.         Judgment: Judgment normal.         WBC   Date/Time Value Ref Range Status   03/01/2024 09:39 AM 9.0 4.4 - 11.3 x10*3/uL Final   09/21/2022 12:34 PM 7.6 4.4 - 11.3 x10E9/L Final   04/14/2022 10:43 AM 13.2 (H) 4.4 - 11.3 x10E9/L Final   11/12/2020 09:28 AM 8.1 4.4 - 11.3 x10E9/L Final     nRBC   Date Value Ref Range Status   03/01/2024 0.0 0.0 - 0.0 /100 WBCs Final   09/21/2022 0.1 /100 WBC Final     RBC   Date Value Ref Range Status   03/01/2024 4.45 4.00 - 5.20 x10*6/uL Final   09/21/2022 4.48 4.00 - 5.20 x10E12/L Final   04/14/2022 4.43 4.00 - 5.20 x10E12/L Final   11/12/2020 4.98 4.00 - 5.20 x10E12/L Final     Hemoglobin   Date Value Ref Range Status   03/01/2024 9.7 (L) 12.0 - 16.0 g/dL Final   09/21/2022 11.6 (L) 12.0 - 16.0 g/dL Final   04/14/2022 9.1 (L) 12.0 - 16.0 g/dL Final   11/12/2020 13.3 12.0 - 16.0 g/dL Final     Hematocrit   Date Value Ref Range Status   03/01/2024 34.5 (L) 36.0 - 46.0 % Final   09/21/2022 36.7 36.0 - 46.0 % Final   04/14/2022 29.6 (L) 36.0 - 46.0 % Final   11/12/2020 41.1 36.0 - 46.0 % Final     MCV   Date/Time Value Ref Range Status   03/01/2024 09:39 AM 78 (L) 80 - 100 fL Final   09/21/2022 12:34 PM 82 80 - 100 fL Final   04/14/2022 10:43 AM 67 (L) 80 - 100 fL Final   11/12/2020 09:28 AM 83 80 - 100 fL Final     MCH   Date/Time Value Ref Range Status   03/01/2024 09:39 AM 21.8 (L) 26.0 - 34.0 pg Final     MCHC   Date/Time Value Ref Range Status   03/01/2024 09:39 AM 28.1 (L) 32.0 - 36.0 g/dL Final   09/21/2022 12:34 PM 31.7 (L) 32.0 - 36.0 g/dL Final   04/14/2022 10:43 AM 30.6 (L) 32.0 - 36.0 g/dL Final   11/12/2020 09:28 AM 32.5 32.0 - 36.0 g/dL Final     RDW   Date/Time Value Ref Range Status   03/01/2024 09:39 AM 18.6 (H) 11.5 - 14.5 % Final   09/21/2022 12:34 PM 20.3 (H) 11.5 - 14.5 % Final   04/14/2022 10:43 AM 18.4 (H) 11.5 - 14.5 % Final   11/12/2020 09:28 AM 15.4  "(H) 11.5 - 14.5 % Final     Platelets   Date/Time Value Ref Range Status   03/01/2024 09:39  150 - 450 x10*3/uL Final   09/21/2022 12:34  150 - 450 x10E9/L Final   04/14/2022 10:43  (H) 150 - 450 x10E9/L Final   11/12/2020 09:28  150 - 450 x10E9/L Final     No results found for: \"MPV\"  Neutrophils %   Date/Time Value Ref Range Status   09/21/2022 12:34 PM 74.9 40.0 - 80.0 % Final     No results found for: \"IGPCT\"  Lymphocytes %   Date/Time Value Ref Range Status   09/21/2022 12:34 PM 18.3 13.0 - 44.0 % Final     Monocytes %   Date/Time Value Ref Range Status   09/21/2022 12:34 PM 5.8 2.0 - 10.0 % Final     Eosinophils %   Date/Time Value Ref Range Status   09/21/2022 12:34 PM 0.5 0.0 - 6.0 % Final     Basophils %   Date/Time Value Ref Range Status   09/21/2022 12:34 PM 0.5 0.0 - 2.0 % Final     Neutrophils Absolute   Date/Time Value Ref Range Status   09/21/2022 12:34 PM 5.70 1.20 - 7.70 x10E9/L Final     Comment:      Percent differential counts (%) should be interpreted in the   context of the absolute cell counts (cells/L).       No results found for: \"IGABSOL\"  Lymphocytes Absolute   Date/Time Value Ref Range Status   09/21/2022 12:34 PM 1.40 1.20 - 4.80 x10E9/L Final     Monocytes Absolute   Date/Time Value Ref Range Status   09/21/2022 12:34 PM 0.40 0.10 - 1.00 x10E9/L Final     Eosinophils Absolute   Date/Time Value Ref Range Status   09/21/2022 12:34 PM 0.00 0.00 - 0.70 x10E9/L Final     Basophils Absolute   Date/Time Value Ref Range Status   09/21/2022 12:34 PM 0.00 0.00 - 0.10 x10E9/L Final       No components found for: \"PT\"  No results found for: \"APTT\"    Assessment/Plan    According to our records patient has been anemic since 4/14/22 with a Hg ranging from 9.1-11.6, remainder of CBC has remained stable with the exception of 4/14/22- both WBC (13.2) and plts (471) were elevated. Since 2022, patients iron parameters have ranged as follows- Fe 15-25, Fe sat 3-6%, Ferritin  as of " "3/01/24 was 24., B12 stable at 884.    Patient is currently taking oral iron and B12 in hopes to maintain her iron level. Patient reports poor energy/easily fatigued. Appetite is stable for patient. Continues to struggle with rapid heart beat and increased VELASCO. Episodes of dizziness/lightheadedness. Denies any abnormal bleeding or bruising. No GI or urinary issues. Denies any overt bleeding. No constitutional \"B\" symptoms reported.     Discussed continuing or iron vs iron infusions. Patient is currently taking oral iron, with little to no change in her labs. Recommendation at this time is to proceed with IV Iron Infusions. Patient is agreeable.     Discussed cardiac concerns with regard to rapid heart beat and increased VELASCO. Discussed possibly due to iron deficiency, however would consider cardiology evaluation.       Discussed possible etiologies including multifactorial, nutritional deficiencies, anemia of chronic disease, malabsorption, hemopathy, medications, bleeding, malignancy, etc. Will start hematological workup today.     Plan  1. Labs today. Will discuss lab results/recommendations at next appt unless something needs immediate follow-up   2. Follow-up in 3-4 weeks    I had an extensive discussion with the patient regarding the diagnosis and discussed the plan of therapy, including general considerations regarding side effects and outcomes. Pt understood and gave appropriate teach back about the plan of care. All questions were answered to the patient's satisfaction. The patient is instructed to contact us at any time if questions or problems arise. Thank you for the opportunity to participate in the care of this very pleasant patient.    Total time =80 minutes. 50% or more of this time was spent in counseling and/or coordination of care including reviewing medical history/radiology/labs, examining patient, formulating outlined plan with team, and discussing plan with patient/family.      Anushka Snyder, " APRN-CNP

## 2024-03-16 LAB — HAPTOGLOB SERPL-MCNC: 159 MG/DL (ref 37–246)

## 2024-03-18 RX ORDER — EPINEPHRINE 0.3 MG/.3ML
0.3 INJECTION SUBCUTANEOUS EVERY 5 MIN PRN
Status: CANCELLED | OUTPATIENT
Start: 2024-03-18

## 2024-03-18 RX ORDER — FAMOTIDINE 10 MG/ML
20 INJECTION INTRAVENOUS ONCE AS NEEDED
Status: CANCELLED | OUTPATIENT
Start: 2024-03-18

## 2024-03-18 RX ORDER — DIPHENHYDRAMINE HYDROCHLORIDE 50 MG/ML
50 INJECTION INTRAMUSCULAR; INTRAVENOUS AS NEEDED
Status: CANCELLED | OUTPATIENT
Start: 2024-03-18

## 2024-03-18 RX ORDER — ALBUTEROL SULFATE 0.83 MG/ML
3 SOLUTION RESPIRATORY (INHALATION) AS NEEDED
Status: CANCELLED | OUTPATIENT
Start: 2024-03-18

## 2024-03-20 ENCOUNTER — TELEPHONE (OUTPATIENT)
Dept: HEMATOLOGY/ONCOLOGY | Facility: CLINIC | Age: 69
End: 2024-03-20
Payer: COMMERCIAL

## 2024-03-26 ENCOUNTER — INFUSION (OUTPATIENT)
Dept: HEMATOLOGY/ONCOLOGY | Facility: CLINIC | Age: 69
End: 2024-03-26
Payer: COMMERCIAL

## 2024-03-26 VITALS
BODY MASS INDEX: 40.5 KG/M2 | TEMPERATURE: 96.3 F | OXYGEN SATURATION: 98 % | WEIGHT: 238.1 LBS | RESPIRATION RATE: 18 BRPM | SYSTOLIC BLOOD PRESSURE: 157 MMHG | DIASTOLIC BLOOD PRESSURE: 104 MMHG | HEART RATE: 105 BPM

## 2024-03-26 DIAGNOSIS — E61.1 IRON DEFICIENCY: ICD-10-CM

## 2024-03-26 DIAGNOSIS — D50.8 OTHER IRON DEFICIENCY ANEMIA: ICD-10-CM

## 2024-03-26 PROCEDURE — 96365 THER/PROPH/DIAG IV INF INIT: CPT

## 2024-03-26 PROCEDURE — 2500000004 HC RX 250 GENERAL PHARMACY W/ HCPCS (ALT 636 FOR OP/ED)

## 2024-03-26 RX ORDER — ALBUTEROL SULFATE 0.83 MG/ML
3 SOLUTION RESPIRATORY (INHALATION) AS NEEDED
Status: CANCELLED | OUTPATIENT
Start: 2024-03-29

## 2024-03-26 RX ORDER — DIPHENHYDRAMINE HYDROCHLORIDE 50 MG/ML
50 INJECTION INTRAMUSCULAR; INTRAVENOUS AS NEEDED
Status: CANCELLED | OUTPATIENT
Start: 2024-03-29

## 2024-03-26 RX ORDER — EPINEPHRINE 0.3 MG/.3ML
0.3 INJECTION SUBCUTANEOUS EVERY 5 MIN PRN
Status: CANCELLED | OUTPATIENT
Start: 2024-03-29

## 2024-03-26 RX ORDER — HEPARIN SODIUM,PORCINE/PF 10 UNIT/ML
50 SYRINGE (ML) INTRAVENOUS AS NEEDED
Status: CANCELLED | OUTPATIENT
Start: 2024-03-26

## 2024-03-26 RX ORDER — HEPARIN 100 UNIT/ML
500 SYRINGE INTRAVENOUS AS NEEDED
Status: CANCELLED | OUTPATIENT
Start: 2024-03-26

## 2024-03-26 RX ORDER — FAMOTIDINE 10 MG/ML
20 INJECTION INTRAVENOUS ONCE AS NEEDED
Status: CANCELLED | OUTPATIENT
Start: 2024-03-29

## 2024-03-26 RX ADMIN — IRON SUCROSE 200 MG: 20 INJECTION, SOLUTION INTRAVENOUS at 10:26

## 2024-03-26 ASSESSMENT — PAIN SCALES - GENERAL: PAINLEVEL: 0-NO PAIN

## 2024-03-28 ENCOUNTER — OFFICE VISIT (OUTPATIENT)
Dept: PRIMARY CARE | Facility: CLINIC | Age: 69
End: 2024-03-28
Payer: COMMERCIAL

## 2024-03-28 ENCOUNTER — INFUSION (OUTPATIENT)
Dept: HEMATOLOGY/ONCOLOGY | Facility: CLINIC | Age: 69
End: 2024-03-28
Payer: COMMERCIAL

## 2024-03-28 VITALS
HEART RATE: 124 BPM | DIASTOLIC BLOOD PRESSURE: 86 MMHG | SYSTOLIC BLOOD PRESSURE: 143 MMHG | HEIGHT: 64 IN | WEIGHT: 237.1 LBS | OXYGEN SATURATION: 97 % | BODY MASS INDEX: 40.48 KG/M2

## 2024-03-28 VITALS
DIASTOLIC BLOOD PRESSURE: 89 MMHG | BODY MASS INDEX: 40.31 KG/M2 | RESPIRATION RATE: 18 BRPM | WEIGHT: 236.99 LBS | SYSTOLIC BLOOD PRESSURE: 146 MMHG | TEMPERATURE: 96.4 F | OXYGEN SATURATION: 99 % | HEART RATE: 94 BPM

## 2024-03-28 DIAGNOSIS — E61.1 IRON DEFICIENCY: ICD-10-CM

## 2024-03-28 DIAGNOSIS — D50.8 OTHER IRON DEFICIENCY ANEMIA: ICD-10-CM

## 2024-03-28 DIAGNOSIS — J01.00 ACUTE NON-RECURRENT MAXILLARY SINUSITIS: Primary | ICD-10-CM

## 2024-03-28 PROCEDURE — 99213 OFFICE O/P EST LOW 20 MIN: CPT | Performed by: FAMILY MEDICINE

## 2024-03-28 PROCEDURE — 1036F TOBACCO NON-USER: CPT | Performed by: FAMILY MEDICINE

## 2024-03-28 PROCEDURE — 96365 THER/PROPH/DIAG IV INF INIT: CPT

## 2024-03-28 PROCEDURE — 2500000004 HC RX 250 GENERAL PHARMACY W/ HCPCS (ALT 636 FOR OP/ED)

## 2024-03-28 PROCEDURE — 1160F RVW MEDS BY RX/DR IN RCRD: CPT | Performed by: FAMILY MEDICINE

## 2024-03-28 PROCEDURE — 1159F MED LIST DOCD IN RCRD: CPT | Performed by: FAMILY MEDICINE

## 2024-03-28 RX ORDER — HEPARIN 100 UNIT/ML
500 SYRINGE INTRAVENOUS AS NEEDED
Status: CANCELLED | OUTPATIENT
Start: 2024-03-28

## 2024-03-28 RX ORDER — ALBUTEROL SULFATE 0.83 MG/ML
3 SOLUTION RESPIRATORY (INHALATION) AS NEEDED
Status: CANCELLED | OUTPATIENT
Start: 2024-03-29

## 2024-03-28 RX ORDER — EPINEPHRINE 0.3 MG/.3ML
0.3 INJECTION SUBCUTANEOUS EVERY 5 MIN PRN
Status: CANCELLED | OUTPATIENT
Start: 2024-03-29

## 2024-03-28 RX ORDER — DOXYCYCLINE HYCLATE 100 MG
100 TABLET ORAL 2 TIMES DAILY
Qty: 20 TABLET | Refills: 0 | Status: SHIPPED | OUTPATIENT
Start: 2024-03-28 | End: 2024-04-07

## 2024-03-28 RX ORDER — HEPARIN SODIUM,PORCINE/PF 10 UNIT/ML
50 SYRINGE (ML) INTRAVENOUS AS NEEDED
Status: CANCELLED | OUTPATIENT
Start: 2024-03-28

## 2024-03-28 RX ORDER — DIPHENHYDRAMINE HYDROCHLORIDE 50 MG/ML
50 INJECTION INTRAMUSCULAR; INTRAVENOUS AS NEEDED
Status: CANCELLED | OUTPATIENT
Start: 2024-03-29

## 2024-03-28 RX ORDER — FAMOTIDINE 10 MG/ML
20 INJECTION INTRAVENOUS ONCE AS NEEDED
Status: CANCELLED | OUTPATIENT
Start: 2024-03-29

## 2024-03-28 RX ADMIN — IRON SUCROSE 200 MG: 20 INJECTION, SOLUTION INTRAVENOUS at 10:25

## 2024-03-28 ASSESSMENT — ENCOUNTER SYMPTOMS
HEADACHES: 1
BRUISES/BLEEDS EASILY: 1
PSYCHIATRIC NEGATIVE: 1
PALPITATIONS: 1
COUGH: 1
SINUS PAIN: 1
LIGHT-HEADEDNESS: 1
DIZZINESS: 1
SINUS PRESSURE: 1
SHORTNESS OF BREATH: 1
SORE THROAT: 0
GASTROINTESTINAL NEGATIVE: 1
FATIGUE: 1
NAUSEA: 0
EYES NEGATIVE: 1
SHORTNESS OF BREATH: 0
FATIGUE: 1
MUSCULOSKELETAL NEGATIVE: 1

## 2024-03-28 ASSESSMENT — PAIN SCALES - GENERAL: PAINLEVEL: 0-NO PAIN

## 2024-03-28 ASSESSMENT — PATIENT HEALTH QUESTIONNAIRE - PHQ9
2. FEELING DOWN, DEPRESSED OR HOPELESS: NOT AT ALL
1. LITTLE INTEREST OR PLEASURE IN DOING THINGS: NOT AT ALL
SUM OF ALL RESPONSES TO PHQ9 QUESTIONS 1 AND 2: 0

## 2024-04-01 ENCOUNTER — INFUSION (OUTPATIENT)
Dept: HEMATOLOGY/ONCOLOGY | Facility: CLINIC | Age: 69
End: 2024-04-01
Payer: COMMERCIAL

## 2024-04-01 VITALS
DIASTOLIC BLOOD PRESSURE: 91 MMHG | BODY MASS INDEX: 40.68 KG/M2 | SYSTOLIC BLOOD PRESSURE: 148 MMHG | WEIGHT: 236.99 LBS | TEMPERATURE: 96.6 F | RESPIRATION RATE: 18 BRPM | HEART RATE: 101 BPM | OXYGEN SATURATION: 97 %

## 2024-04-01 DIAGNOSIS — D50.8 OTHER IRON DEFICIENCY ANEMIA: ICD-10-CM

## 2024-04-01 DIAGNOSIS — E61.1 IRON DEFICIENCY: ICD-10-CM

## 2024-04-01 PROCEDURE — 96365 THER/PROPH/DIAG IV INF INIT: CPT

## 2024-04-01 PROCEDURE — 2500000004 HC RX 250 GENERAL PHARMACY W/ HCPCS (ALT 636 FOR OP/ED)

## 2024-04-01 RX ORDER — HEPARIN 100 UNIT/ML
500 SYRINGE INTRAVENOUS AS NEEDED
Status: CANCELLED | OUTPATIENT
Start: 2024-04-01

## 2024-04-01 RX ORDER — HEPARIN SODIUM,PORCINE/PF 10 UNIT/ML
50 SYRINGE (ML) INTRAVENOUS AS NEEDED
Status: CANCELLED | OUTPATIENT
Start: 2024-04-01

## 2024-04-01 RX ORDER — FAMOTIDINE 10 MG/ML
20 INJECTION INTRAVENOUS ONCE AS NEEDED
Status: CANCELLED | OUTPATIENT
Start: 2024-04-03

## 2024-04-01 RX ORDER — DIPHENHYDRAMINE HYDROCHLORIDE 50 MG/ML
50 INJECTION INTRAMUSCULAR; INTRAVENOUS AS NEEDED
Status: CANCELLED | OUTPATIENT
Start: 2024-04-03

## 2024-04-01 RX ORDER — EPINEPHRINE 0.3 MG/.3ML
0.3 INJECTION SUBCUTANEOUS EVERY 5 MIN PRN
Status: CANCELLED | OUTPATIENT
Start: 2024-04-03

## 2024-04-01 RX ORDER — ALBUTEROL SULFATE 0.83 MG/ML
3 SOLUTION RESPIRATORY (INHALATION) AS NEEDED
Status: CANCELLED | OUTPATIENT
Start: 2024-04-03

## 2024-04-01 RX ADMIN — IRON SUCROSE 200 MG: 20 INJECTION, SOLUTION INTRAVENOUS at 10:19

## 2024-04-01 ASSESSMENT — PAIN SCALES - GENERAL: PAINLEVEL: 0-NO PAIN

## 2024-04-03 ENCOUNTER — INFUSION (OUTPATIENT)
Dept: HEMATOLOGY/ONCOLOGY | Facility: CLINIC | Age: 69
End: 2024-04-03
Payer: COMMERCIAL

## 2024-04-03 VITALS
OXYGEN SATURATION: 98 % | SYSTOLIC BLOOD PRESSURE: 138 MMHG | TEMPERATURE: 96.3 F | RESPIRATION RATE: 18 BRPM | DIASTOLIC BLOOD PRESSURE: 95 MMHG | HEART RATE: 93 BPM | BODY MASS INDEX: 40.68 KG/M2 | WEIGHT: 236.99 LBS

## 2024-04-03 DIAGNOSIS — D50.8 OTHER IRON DEFICIENCY ANEMIA: ICD-10-CM

## 2024-04-03 DIAGNOSIS — E61.1 IRON DEFICIENCY: ICD-10-CM

## 2024-04-03 PROCEDURE — 2500000004 HC RX 250 GENERAL PHARMACY W/ HCPCS (ALT 636 FOR OP/ED)

## 2024-04-03 PROCEDURE — 96365 THER/PROPH/DIAG IV INF INIT: CPT

## 2024-04-03 RX ORDER — ALBUTEROL SULFATE 0.83 MG/ML
3 SOLUTION RESPIRATORY (INHALATION) AS NEEDED
Status: CANCELLED | OUTPATIENT
Start: 2024-04-04

## 2024-04-03 RX ORDER — HEPARIN 100 UNIT/ML
500 SYRINGE INTRAVENOUS AS NEEDED
Status: CANCELLED | OUTPATIENT
Start: 2024-04-03

## 2024-04-03 RX ORDER — DIPHENHYDRAMINE HYDROCHLORIDE 50 MG/ML
50 INJECTION INTRAMUSCULAR; INTRAVENOUS AS NEEDED
Status: CANCELLED | OUTPATIENT
Start: 2024-04-04

## 2024-04-03 RX ORDER — FAMOTIDINE 10 MG/ML
20 INJECTION INTRAVENOUS ONCE AS NEEDED
Status: CANCELLED | OUTPATIENT
Start: 2024-04-04

## 2024-04-03 RX ORDER — EPINEPHRINE 0.3 MG/.3ML
0.3 INJECTION SUBCUTANEOUS EVERY 5 MIN PRN
Status: CANCELLED | OUTPATIENT
Start: 2024-04-04

## 2024-04-03 RX ORDER — HEPARIN SODIUM,PORCINE/PF 10 UNIT/ML
50 SYRINGE (ML) INTRAVENOUS AS NEEDED
Status: CANCELLED | OUTPATIENT
Start: 2024-04-03

## 2024-04-03 RX ADMIN — IRON SUCROSE 200 MG: 20 INJECTION, SOLUTION INTRAVENOUS at 10:35

## 2024-04-03 ASSESSMENT — PAIN SCALES - GENERAL: PAINLEVEL: 0-NO PAIN

## 2024-04-05 ENCOUNTER — INFUSION (OUTPATIENT)
Dept: HEMATOLOGY/ONCOLOGY | Facility: CLINIC | Age: 69
End: 2024-04-05
Payer: COMMERCIAL

## 2024-04-05 VITALS
RESPIRATION RATE: 80 BRPM | OXYGEN SATURATION: 98 % | HEART RATE: 93 BPM | TEMPERATURE: 95.4 F | BODY MASS INDEX: 40.72 KG/M2 | SYSTOLIC BLOOD PRESSURE: 160 MMHG | DIASTOLIC BLOOD PRESSURE: 85 MMHG | WEIGHT: 237.22 LBS

## 2024-04-05 DIAGNOSIS — E61.1 IRON DEFICIENCY: ICD-10-CM

## 2024-04-05 DIAGNOSIS — D50.8 OTHER IRON DEFICIENCY ANEMIA: ICD-10-CM

## 2024-04-05 PROCEDURE — 2500000004 HC RX 250 GENERAL PHARMACY W/ HCPCS (ALT 636 FOR OP/ED)

## 2024-04-05 PROCEDURE — 96365 THER/PROPH/DIAG IV INF INIT: CPT

## 2024-04-05 RX ORDER — DIPHENHYDRAMINE HYDROCHLORIDE 50 MG/ML
50 INJECTION INTRAMUSCULAR; INTRAVENOUS AS NEEDED
OUTPATIENT
Start: 2024-04-06

## 2024-04-05 RX ORDER — FAMOTIDINE 10 MG/ML
20 INJECTION INTRAVENOUS ONCE AS NEEDED
OUTPATIENT
Start: 2024-04-06

## 2024-04-05 RX ORDER — HEPARIN SODIUM,PORCINE/PF 10 UNIT/ML
50 SYRINGE (ML) INTRAVENOUS AS NEEDED
OUTPATIENT
Start: 2024-04-05

## 2024-04-05 RX ORDER — ALBUTEROL SULFATE 0.83 MG/ML
3 SOLUTION RESPIRATORY (INHALATION) AS NEEDED
OUTPATIENT
Start: 2024-04-06

## 2024-04-05 RX ORDER — EPINEPHRINE 0.3 MG/.3ML
0.3 INJECTION SUBCUTANEOUS EVERY 5 MIN PRN
OUTPATIENT
Start: 2024-04-06

## 2024-04-05 RX ORDER — HEPARIN 100 UNIT/ML
500 SYRINGE INTRAVENOUS AS NEEDED
OUTPATIENT
Start: 2024-04-05

## 2024-04-05 RX ADMIN — IRON SUCROSE 200 MG: 20 INJECTION, SOLUTION INTRAVENOUS at 11:22

## 2024-04-05 ASSESSMENT — PAIN SCALES - GENERAL: PAINLEVEL: 0-NO PAIN

## 2024-04-12 ENCOUNTER — APPOINTMENT (OUTPATIENT)
Dept: PRIMARY CARE | Facility: CLINIC | Age: 69
End: 2024-04-12
Payer: COMMERCIAL

## 2024-04-30 NOTE — PROGRESS NOTES
"Subjective   Patient ID: Eloisa Boogie is a 68 y.o. female who presents for Sinusitis (x1wk).    Sinusitis  Associated symptoms include congestion, coughing, ear pain, headaches and sinus pressure. Pertinent negatives include no shortness of breath or sore throat.      Sinus pressure mostly on the right side maxillary sinuses for the last week getting worse.  Also does have known allergies but not bothering her as much now.  Exam is benign except for tenderness to palpation of the right maxillary sinus, little bit of the left maxillary sinus.    Doxycycline for 10 days    Separately did not tolerate the Anoro Ellipta, made her cough worse and cause diarrhea.  Interestingly no bladder irritation.  No new medicine at this time.    Review of Systems   Constitutional:  Positive for fatigue.   HENT:  Positive for congestion, ear pain, sinus pressure and sinus pain. Negative for sore throat.    Respiratory:  Positive for cough. Negative for shortness of breath.    Gastrointestinal:  Negative for nausea.   Neurological:  Positive for headaches.       Objective   /86   Pulse (!) 124   Ht 1.626 m (5' 4\")   Wt 108 kg (237 lb 1.6 oz)   LMP  (LMP Unknown)   SpO2 97%   BMI 40.70 kg/m²     Physical Exam  Constitutional:       Appearance: Normal appearance.   HENT:      Head: Normocephalic and atraumatic.   Cardiovascular:      Rate and Rhythm: Normal rate and regular rhythm.      Heart sounds: Normal heart sounds.   Pulmonary:      Effort: Pulmonary effort is normal.      Breath sounds: Normal breath sounds.   Skin:     General: Skin is warm and dry.   Neurological:      General: No focal deficit present.      Mental Status: She is alert and oriented to person, place, and time.   Psychiatric:         Mood and Affect: Mood normal.         Behavior: Behavior normal.         Thought Content: Thought content normal.         Judgment: Judgment normal.         Assessment/Plan   Problem List Items Addressed This Visit  " WILL RESUBMIT RX WITH CORRECT DOSING     None  Visit Diagnoses         Codes    Acute non-recurrent maxillary sinusitis    -  Primary J01.00    Relevant Medications    doxycycline (Vibra-Tabs) 100 mg tablet

## 2024-05-14 ENCOUNTER — LAB (OUTPATIENT)
Dept: LAB | Facility: LAB | Age: 69
End: 2024-05-14
Payer: COMMERCIAL

## 2024-05-14 DIAGNOSIS — E61.1 IRON DEFICIENCY: Primary | ICD-10-CM

## 2024-05-14 DIAGNOSIS — E61.1 IRON DEFICIENCY: ICD-10-CM

## 2024-05-14 LAB
ALBUMIN SERPL BCP-MCNC: 3.7 G/DL (ref 3.4–5)
ALP SERPL-CCNC: 71 U/L (ref 33–136)
ALT SERPL W P-5'-P-CCNC: 11 U/L (ref 7–45)
ANION GAP SERPL CALC-SCNC: 10 MMOL/L (ref 10–20)
AST SERPL W P-5'-P-CCNC: 12 U/L (ref 9–39)
BASOPHILS # BLD AUTO: 0.05 X10*3/UL (ref 0–0.1)
BASOPHILS NFR BLD AUTO: 0.7 %
BILIRUB SERPL-MCNC: 0.3 MG/DL (ref 0–1.2)
BUN SERPL-MCNC: 15 MG/DL (ref 6–23)
CALCIUM SERPL-MCNC: 9.1 MG/DL (ref 8.6–10.3)
CHLORIDE SERPL-SCNC: 108 MMOL/L (ref 98–107)
CO2 SERPL-SCNC: 25 MMOL/L (ref 21–32)
CREAT SERPL-MCNC: 0.62 MG/DL (ref 0.5–1.05)
EGFRCR SERPLBLD CKD-EPI 2021: >90 ML/MIN/1.73M*2
EOSINOPHIL # BLD AUTO: 0.24 X10*3/UL (ref 0–0.7)
EOSINOPHIL NFR BLD AUTO: 3.6 %
ERYTHROCYTE [DISTWIDTH] IN BLOOD BY AUTOMATED COUNT: 19.6 % (ref 11.5–14.5)
FERRITIN SERPL-MCNC: 61 NG/ML (ref 8–150)
GLUCOSE SERPL-MCNC: 101 MG/DL (ref 74–99)
HCT VFR BLD AUTO: 36.4 % (ref 36–46)
HGB BLD-MCNC: 10.7 G/DL (ref 12–16)
IMM GRANULOCYTES # BLD AUTO: 0.03 X10*3/UL (ref 0–0.7)
IMM GRANULOCYTES NFR BLD AUTO: 0.4 % (ref 0–0.9)
IRON SATN MFR SERPL: 12 % (ref 25–45)
IRON SERPL-MCNC: 46 UG/DL (ref 35–150)
LYMPHOCYTES # BLD AUTO: 2 X10*3/UL (ref 1.2–4.8)
LYMPHOCYTES NFR BLD AUTO: 29.9 %
MCH RBC QN AUTO: 24.5 PG (ref 26–34)
MCHC RBC AUTO-ENTMCNC: 29.4 G/DL (ref 32–36)
MCV RBC AUTO: 83 FL (ref 80–100)
MONOCYTES # BLD AUTO: 0.55 X10*3/UL (ref 0.1–1)
MONOCYTES NFR BLD AUTO: 8.2 %
NEUTROPHILS # BLD AUTO: 3.82 X10*3/UL (ref 1.2–7.7)
NEUTROPHILS NFR BLD AUTO: 57.2 %
NRBC BLD-RTO: 0 /100 WBCS (ref 0–0)
PLATELET # BLD AUTO: 246 X10*3/UL (ref 150–450)
POTASSIUM SERPL-SCNC: 4.1 MMOL/L (ref 3.5–5.3)
PROT SERPL-MCNC: 5.7 G/DL (ref 6.4–8.2)
RBC # BLD AUTO: 4.37 X10*6/UL (ref 4–5.2)
SODIUM SERPL-SCNC: 139 MMOL/L (ref 136–145)
TIBC SERPL-MCNC: 374 UG/DL (ref 240–445)
UIBC SERPL-MCNC: 328 UG/DL (ref 110–370)
VIT B12 SERPL-MCNC: 852 PG/ML (ref 211–911)
WBC # BLD AUTO: 6.7 X10*3/UL (ref 4.4–11.3)

## 2024-05-14 PROCEDURE — 82607 VITAMIN B-12: CPT

## 2024-05-14 PROCEDURE — 83550 IRON BINDING TEST: CPT

## 2024-05-14 PROCEDURE — 80053 COMPREHEN METABOLIC PANEL: CPT

## 2024-05-14 PROCEDURE — 83540 ASSAY OF IRON: CPT

## 2024-05-14 PROCEDURE — 36415 COLL VENOUS BLD VENIPUNCTURE: CPT

## 2024-05-14 PROCEDURE — 82728 ASSAY OF FERRITIN: CPT

## 2024-05-14 PROCEDURE — 85025 COMPLETE CBC W/AUTO DIFF WBC: CPT

## 2024-05-17 ENCOUNTER — OFFICE VISIT (OUTPATIENT)
Dept: HEMATOLOGY/ONCOLOGY | Facility: CLINIC | Age: 69
End: 2024-05-17
Payer: COMMERCIAL

## 2024-05-17 VITALS
WEIGHT: 238.6 LBS | DIASTOLIC BLOOD PRESSURE: 83 MMHG | OXYGEN SATURATION: 96 % | SYSTOLIC BLOOD PRESSURE: 170 MMHG | RESPIRATION RATE: 16 BRPM | HEART RATE: 95 BPM | BODY MASS INDEX: 40.96 KG/M2 | TEMPERATURE: 97.2 F

## 2024-05-17 DIAGNOSIS — E61.1 IRON DEFICIENCY: ICD-10-CM

## 2024-05-17 DIAGNOSIS — D50.8 OTHER IRON DEFICIENCY ANEMIA: ICD-10-CM

## 2024-05-17 PROCEDURE — 99213 OFFICE O/P EST LOW 20 MIN: CPT

## 2024-05-17 PROCEDURE — 1159F MED LIST DOCD IN RCRD: CPT

## 2024-05-17 PROCEDURE — 1126F AMNT PAIN NOTED NONE PRSNT: CPT

## 2024-05-17 PROCEDURE — 1160F RVW MEDS BY RX/DR IN RCRD: CPT

## 2024-05-17 ASSESSMENT — PAIN SCALES - GENERAL: PAINLEVEL: 0-NO PAIN

## 2024-05-17 ASSESSMENT — COLUMBIA-SUICIDE SEVERITY RATING SCALE - C-SSRS
6. HAVE YOU EVER DONE ANYTHING, STARTED TO DO ANYTHING, OR PREPARED TO DO ANYTHING TO END YOUR LIFE?: NO
1. IN THE PAST MONTH, HAVE YOU WISHED YOU WERE DEAD OR WISHED YOU COULD GO TO SLEEP AND NOT WAKE UP?: NO
2. HAVE YOU ACTUALLY HAD ANY THOUGHTS OF KILLING YOURSELF?: NO

## 2024-05-17 NOTE — PATIENT INSTRUCTIONS
Start oral Vitron C every other day  Continue on oral B12 1000mcg daily  RTC in 2 months with labs prior    (CBC w/diff, CMP, Ferritin, Iron, B12)

## 2024-05-17 NOTE — PROGRESS NOTES
Patient ID: Eloisa Boogie is a 68 y.o. female.    Subjective    HPI  Patient ID: Eloisa Boogie is a 68 y.o. female.  Referring Physician: Jose Taylor MD  [address] [place] [address]  Primary Care Provider: Jose Taylor MD  Visit Type: Initial Visit         Subjective   HPI     Patient is a 67 yo female with a PMH of ALCIRA, Anemia, Hiatal Hernia and was referred to benign hematology for consultation of Anemia.     Recent lab work revealed her hg remains low. Patient reports she has struggle with anemia and iron deficiency since her hip replacement in 2016. Patient had a colonoscopy in 2023 to rule out GI bleeding. Coloscopy revealed two polyps, otherwise no significant findings. GI ordered iron infusions, and then started her on oral iron and B12 in hopes to manage iron parameters.       Today, patient presents for initial consultation. Appetite is decent for patient, 1-2 meals on average. Energy is poor/fatigues easily. Since having COVID in 2022  has been struggling with rapid heart beat and increased VELASCO. No abnormal bleeding or bruising. No GI issues. Denies any hematochezia or melena. No urinary issues, no UTIs, dysuria, hematuria. No fevers, night sweats, weight is stable. No recurring infections. No bone pain. Episodes of lightheadedness, and dizziness. Great dentition, left raised rash near elbow, dry skin and hair. No PICA.     Interval History 5/17/24:    Post iron infusion   Patient reports her energy, breathing and heart rate have improved  Appetite remains decent   Denies any abnormal bleeding or bruising  No GI or urinary issues.   No fevers, chills or infections  She overall feels well today     up to date on cancer screenings    Objective    BSA: There is no height or weight on file to calculate BSA.  LMP  (LMP Unknown)      Physical Exam  Vitals and nursing note reviewed.   Constitutional:       Appearance: Normal appearance.   HENT:      Head: Normocephalic and atraumatic.       "Mouth/Throat:      Pharynx: Oropharynx is clear.   Eyes:      General: No scleral icterus.     Extraocular Movements: Extraocular movements intact.      Conjunctiva/sclera: Conjunctivae normal.   Cardiovascular:      Rate and Rhythm: Normal rate and regular rhythm.      Pulses: Normal pulses.      Heart sounds: Normal heart sounds.   Pulmonary:      Effort: Pulmonary effort is normal.      Breath sounds: Normal breath sounds.   Abdominal:      General: Bowel sounds are normal.      Palpations: Abdomen is soft.      Tenderness: There is no abdominal tenderness.   Musculoskeletal:         General: Normal range of motion.      Cervical back: Normal range of motion.   Skin:     General: Skin is warm and dry.   Neurological:      General: No focal deficit present.      Mental Status: She is alert and oriented to person, place, and time.   Psychiatric:         Mood and Affect: Mood normal.         Behavior: Behavior normal.         Thought Content: Thought content normal.         Judgment: Judgment normal.         Performance Status:  Symptomatic; fully ambulatory      Assessment/Plan      According to our records patient has been anemic since 4/14/22 with a Hg ranging from 9.1-11.6, remainder of CBC has remained stable with the exception of 4/14/22- both WBC (13.2) and plts (471) were elevated. Since 2022, patients iron parameters have ranged as follows- Fe 15-25, Fe sat 3-6%, Ferritin  as of 3/01/24 was 24., B12 stable at 884.     Patient is currently taking oral iron and B12 in hopes to maintain her iron level. Patient reports poor energy/easily fatigued. Appetite is stable for patient. Continues to struggle with rapid heart beat and increased VELASCO. Episodes of dizziness/lightheadedness. Denies any abnormal bleeding or bruising. No GI or urinary issues. Denies any overt bleeding. No constitutional \"B\" symptoms reported.      Discussed continuing or iron vs iron infusions. Patient is currently taking oral iron, with " little to no change in her labs. Recommendation at this time is to proceed with IV Iron Infusions. Patient is agreeable.      Discussed cardiac concerns with regard to rapid heart beat and increased VELASCO. Discussed possibly due to iron deficiency, however would consider cardiology evaluation.       Discussed possible etiologies including multifactorial, nutritional deficiencies, anemia of chronic disease, malabsorption, hemopathy, medications, bleeding, malignancy, etc. Will start hematological workup today.     5/17/24:  Post iron infusion, patient reports significant improvement in her breathing, energy and heart rate. She occasional still experiences an episode of palpitations/rapid heart rate. Appetite is decent. Denies any abnormal bleeding or bruising issues. No GI or urinary issues. Overall she feels well today.   Reviewed labs:   Hg 10.7, WBC 6.7, Plts 246k  Fe 46, Fe sat 12%, Ferritin 61, B12 852    Patient remains mildly anemic with iron deficiency. Discussed ordering a subsequent course of iron vs trying oral iron. Patient would like to try oral iron. Continue on oral B12 daily. Will continue to monitor labs closely with consideration of periodic infusions.        Plan:    Start oral Vitron C every other day  Continue on oral B12 1000mcg daily  RTC in 2 months with labs prior    (CBC w/diff, CMP, Ferritin, Iron, B12)       I had an extensive discussion with the patient regarding the diagnosis and discussed the plan of therapy, including general considerations regarding side effects and outcomes. Pt understood and gave appropriate teach back about the plan of care. All questions were answered to the patient's satisfaction. The patient is instructed to contact us at any time if questions or problems arise. Thank you for the opportunity to participate in the care of this very pleasant patient.     Total time =80 minutes. 50% or more of this time was spent in counseling and/or coordination of care including  reviewing medical history/radiology/labs, examining patient, formulating outlined plan with team, and discussing plan with patient/family.       Anushka Snyder, APRN-CNP

## 2024-05-17 NOTE — PROGRESS NOTES
Pt instructed to get labs prior to his next appt  Rtc 7/19 1000 for CNP  Reviewed AVS with patient- patient verbalizes understanding

## 2024-07-10 DIAGNOSIS — D50.8 OTHER IRON DEFICIENCY ANEMIA: ICD-10-CM

## 2024-07-10 DIAGNOSIS — E61.1 IRON DEFICIENCY: ICD-10-CM

## 2024-07-15 ENCOUNTER — LAB (OUTPATIENT)
Dept: LAB | Facility: LAB | Age: 69
End: 2024-07-15
Payer: COMMERCIAL

## 2024-07-15 DIAGNOSIS — D50.8 OTHER IRON DEFICIENCY ANEMIA: ICD-10-CM

## 2024-07-15 DIAGNOSIS — E61.1 IRON DEFICIENCY: ICD-10-CM

## 2024-07-15 LAB
ALBUMIN SERPL BCP-MCNC: 3.8 G/DL (ref 3.4–5)
ALP SERPL-CCNC: 88 U/L (ref 33–136)
ALT SERPL W P-5'-P-CCNC: 12 U/L (ref 7–45)
ANION GAP SERPL CALC-SCNC: 11 MMOL/L (ref 10–20)
AST SERPL W P-5'-P-CCNC: 13 U/L (ref 9–39)
BASOPHILS # BLD AUTO: 0.06 X10*3/UL (ref 0–0.1)
BASOPHILS NFR BLD AUTO: 0.8 %
BILIRUB SERPL-MCNC: 0.2 MG/DL (ref 0–1.2)
BUN SERPL-MCNC: 18 MG/DL (ref 6–23)
CALCIUM SERPL-MCNC: 9.8 MG/DL (ref 8.6–10.3)
CHLORIDE SERPL-SCNC: 111 MMOL/L (ref 98–107)
CO2 SERPL-SCNC: 25 MMOL/L (ref 21–32)
CREAT SERPL-MCNC: 0.63 MG/DL (ref 0.5–1.05)
EGFRCR SERPLBLD CKD-EPI 2021: >90 ML/MIN/1.73M*2
EOSINOPHIL # BLD AUTO: 0.24 X10*3/UL (ref 0–0.7)
EOSINOPHIL NFR BLD AUTO: 3.1 %
ERYTHROCYTE [DISTWIDTH] IN BLOOD BY AUTOMATED COUNT: 15.2 % (ref 11.5–14.5)
FERRITIN SERPL-MCNC: 56 NG/ML (ref 8–150)
GLUCOSE SERPL-MCNC: 112 MG/DL (ref 74–99)
HCT VFR BLD AUTO: 41 % (ref 36–46)
HGB BLD-MCNC: 12.1 G/DL (ref 12–16)
IMM GRANULOCYTES # BLD AUTO: 0.06 X10*3/UL (ref 0–0.7)
IMM GRANULOCYTES NFR BLD AUTO: 0.8 % (ref 0–0.9)
IRON SATN MFR SERPL: 10 % (ref 25–45)
IRON SERPL-MCNC: 35 UG/DL (ref 35–150)
LYMPHOCYTES # BLD AUTO: 2.19 X10*3/UL (ref 1.2–4.8)
LYMPHOCYTES NFR BLD AUTO: 28 %
MCH RBC QN AUTO: 25.3 PG (ref 26–34)
MCHC RBC AUTO-ENTMCNC: 29.5 G/DL (ref 32–36)
MCV RBC AUTO: 86 FL (ref 80–100)
MONOCYTES # BLD AUTO: 0.74 X10*3/UL (ref 0.1–1)
MONOCYTES NFR BLD AUTO: 9.5 %
NEUTROPHILS # BLD AUTO: 4.54 X10*3/UL (ref 1.2–7.7)
NEUTROPHILS NFR BLD AUTO: 57.8 %
NRBC BLD-RTO: 0 /100 WBCS (ref 0–0)
PLATELET # BLD AUTO: 300 X10*3/UL (ref 150–450)
POTASSIUM SERPL-SCNC: 3.9 MMOL/L (ref 3.5–5.3)
PROT SERPL-MCNC: 6.7 G/DL (ref 6.4–8.2)
RBC # BLD AUTO: 4.79 X10*6/UL (ref 4–5.2)
SODIUM SERPL-SCNC: 143 MMOL/L (ref 136–145)
TIBC SERPL-MCNC: 335 UG/DL (ref 240–445)
UIBC SERPL-MCNC: 300 UG/DL (ref 110–370)
VIT B12 SERPL-MCNC: 801 PG/ML (ref 211–911)
WBC # BLD AUTO: 7.8 X10*3/UL (ref 4.4–11.3)

## 2024-07-15 PROCEDURE — 83550 IRON BINDING TEST: CPT

## 2024-07-15 PROCEDURE — 82607 VITAMIN B-12: CPT

## 2024-07-15 PROCEDURE — 83540 ASSAY OF IRON: CPT

## 2024-07-15 PROCEDURE — 80053 COMPREHEN METABOLIC PANEL: CPT

## 2024-07-15 PROCEDURE — 85025 COMPLETE CBC W/AUTO DIFF WBC: CPT

## 2024-07-15 PROCEDURE — 82728 ASSAY OF FERRITIN: CPT

## 2024-07-15 PROCEDURE — 36415 COLL VENOUS BLD VENIPUNCTURE: CPT

## 2024-07-19 ENCOUNTER — OFFICE VISIT (OUTPATIENT)
Dept: HEMATOLOGY/ONCOLOGY | Facility: CLINIC | Age: 69
End: 2024-07-19
Payer: COMMERCIAL

## 2024-07-19 VITALS
WEIGHT: 233.6 LBS | OXYGEN SATURATION: 95 % | SYSTOLIC BLOOD PRESSURE: 129 MMHG | TEMPERATURE: 96.6 F | RESPIRATION RATE: 16 BRPM | DIASTOLIC BLOOD PRESSURE: 96 MMHG | BODY MASS INDEX: 40.1 KG/M2 | HEART RATE: 104 BPM

## 2024-07-19 DIAGNOSIS — E61.1 IRON DEFICIENCY: Primary | ICD-10-CM

## 2024-07-19 DIAGNOSIS — D50.8 OTHER IRON DEFICIENCY ANEMIA: ICD-10-CM

## 2024-07-19 PROCEDURE — 1125F AMNT PAIN NOTED PAIN PRSNT: CPT

## 2024-07-19 PROCEDURE — 99214 OFFICE O/P EST MOD 30 MIN: CPT

## 2024-07-19 PROCEDURE — 1159F MED LIST DOCD IN RCRD: CPT

## 2024-07-19 RX ORDER — EPINEPHRINE 0.3 MG/.3ML
0.3 INJECTION SUBCUTANEOUS EVERY 5 MIN PRN
Status: CANCELLED | OUTPATIENT
Start: 2024-07-19

## 2024-07-19 RX ORDER — ALBUTEROL SULFATE 0.83 MG/ML
3 SOLUTION RESPIRATORY (INHALATION) AS NEEDED
Status: CANCELLED | OUTPATIENT
Start: 2024-07-19

## 2024-07-19 RX ORDER — DIPHENHYDRAMINE HYDROCHLORIDE 50 MG/ML
50 INJECTION INTRAMUSCULAR; INTRAVENOUS AS NEEDED
Status: CANCELLED | OUTPATIENT
Start: 2024-07-19

## 2024-07-19 RX ORDER — FAMOTIDINE 10 MG/ML
20 INJECTION INTRAVENOUS ONCE AS NEEDED
Status: CANCELLED | OUTPATIENT
Start: 2024-07-19

## 2024-07-19 ASSESSMENT — COLUMBIA-SUICIDE SEVERITY RATING SCALE - C-SSRS
1. IN THE PAST MONTH, HAVE YOU WISHED YOU WERE DEAD OR WISHED YOU COULD GO TO SLEEP AND NOT WAKE UP?: NO
6. HAVE YOU EVER DONE ANYTHING, STARTED TO DO ANYTHING, OR PREPARED TO DO ANYTHING TO END YOUR LIFE?: NO
2. HAVE YOU ACTUALLY HAD ANY THOUGHTS OF KILLING YOURSELF?: NO

## 2024-07-19 ASSESSMENT — PATIENT HEALTH QUESTIONNAIRE - PHQ9
2. FEELING DOWN, DEPRESSED OR HOPELESS: NOT AT ALL
SUM OF ALL RESPONSES TO PHQ9 QUESTIONS 1 AND 2: 0
1. LITTLE INTEREST OR PLEASURE IN DOING THINGS: NOT AT ALL

## 2024-07-19 ASSESSMENT — PAIN SCALES - GENERAL: PAINLEVEL: 6

## 2024-07-19 NOTE — PATIENT INSTRUCTIONS
Schedule for Venofer infusions   Increase oral iron to twice daily due to dosing   RTC in 3 months after infusions with labs prior    (CBC w/diff, CMP, Ferritin, Iron, B12)

## 2024-07-19 NOTE — PROGRESS NOTES
Patient ID: Eloisa Boogie is a 68 y.o. female.    Subjective    HPI  HPI     Patient is a 69 yo female with a PMH of ALCIRA, Anemia, Hiatal Hernia and was referred to benign hematology for consultation of Anemia.     Recent lab work revealed her hg remains low. Patient reports she has struggle with anemia and iron deficiency since her hip replacement in 2016. Patient had a colonoscopy in 2023 to rule out GI bleeding. Coloscopy revealed two polyps, otherwise no significant findings. GI ordered iron infusions, and then started her on oral iron and B12 in hopes to manage iron parameters.       Today, patient presents for initial consultation. Appetite is decent for patient, 1-2 meals on average. Energy is poor/fatigues easily. Since having COVID in 2022  has been struggling with rapid heart beat and increased VELASCO. No abnormal bleeding or bruising. No GI issues. Denies any hematochezia or melena. No urinary issues, no UTIs, dysuria, hematuria. No fevers, night sweats, weight is stable. No recurring infections. No bone pain. Episodes of lightheadedness, and dizziness. Great dentition, left raised rash near elbow, dry skin and hair. No PICA.     Interval History 7/19/24     Last iron infusion 4/5/24  Energy is decent  breathing and heart rate have improved  Appetite remains decent   Denies any abnormal bleeding or bruising  No GI issues, no hematuria or melena     Recently had a fever, body aches, and a terrible headache, symptoms have resolved   Dizziness due to right feeling plugged  No recent falls  Chronic Myalgias/Arthralgias    Remains on a vitamin with B12/Iron/Folate/Vitamin C      She overall feels well today      up to date on cancer screenings             Objective    BSA: There is no height or weight on file to calculate BSA.  LMP  (LMP Unknown)      Physical Exam  Vitals and nursing note reviewed.   Constitutional:       Appearance: Normal appearance.   HENT:      Head: Normocephalic and atraumatic.      Nose:  "Nose normal.      Mouth/Throat:      Mouth: Mucous membranes are dry.      Pharynx: Oropharynx is clear.   Eyes:      General: No scleral icterus.     Extraocular Movements: Extraocular movements intact.      Conjunctiva/sclera: Conjunctivae normal.   Cardiovascular:      Rate and Rhythm: Normal rate and regular rhythm.      Pulses: Normal pulses.      Heart sounds: Normal heart sounds.   Pulmonary:      Effort: Pulmonary effort is normal.      Breath sounds: Normal breath sounds.   Abdominal:      Palpations: Abdomen is soft.      Tenderness: There is no abdominal tenderness.   Musculoskeletal:         General: Normal range of motion.      Cervical back: Normal range of motion.   Skin:     General: Skin is warm and dry.   Neurological:      General: No focal deficit present.      Mental Status: She is alert and oriented to person, place, and time.   Psychiatric:         Mood and Affect: Mood normal.         Behavior: Behavior normal.         Thought Content: Thought content normal.         Judgment: Judgment normal.         Performance Status:  Symptomatic; fully ambulatory      Assessment/Plan      According to our records patient has been anemic since 4/14/22 with a Hg ranging from 9.1-11.6, remainder of CBC has remained stable with the exception of 4/14/22- both WBC (13.2) and plts (471) were elevated. Since 2022, patients iron parameters have ranged as follows- Fe 15-25, Fe sat 3-6%, Ferritin  as of 3/01/24 was 24., B12 stable at 884.     Patient is currently taking oral iron and B12 in hopes to maintain her iron level. Patient reports poor energy/easily fatigued. Appetite is stable for patient. Continues to struggle with rapid heart beat and increased VELASCO. Episodes of dizziness/lightheadedness. Denies any abnormal bleeding or bruising. No GI or urinary issues. Denies any overt bleeding. No constitutional \"B\" symptoms reported.      Discussed continuing or iron vs iron infusions. Patient is currently taking " oral iron, with little to no change in her labs. Recommendation at this time is to proceed with IV Iron Infusions. Patient is agreeable.      Discussed cardiac concerns with regard to rapid heart beat and increased VELASCO. Discussed possibly due to iron deficiency, however would consider cardiology evaluation.       Discussed possible etiologies including multifactorial, nutritional deficiencies, anemia of chronic disease, malabsorption, hemopathy, medications, bleeding, malignancy, etc. Will start hematological workup today.      7/19/24:     Iron deficiency anemia:  -Her anemia has corrected. Hg 12.1.  Remains iron deficient. Fe 35, Fe sat 10%, Ferritin 56, most likely caused by intestinal malabsorption.  - B12 is stable at 801  - Continues on oral supplement with iron (28mg),  B12 (8mcq), Folate (400mcq), Vitamin C (60 mg) daily  Encouraged patient to take 1 tablet twice daily.   - Recommendation repeat course of IV iron sucrose (venofer). I reviewed treatment with patient. Patient is agreeable to proceed accordingly. Orders have been placed, accordingly.  -follow up in 3 months with labs prior to appointment       Plan:  Schedule for Venofer infusions   Increase oral iron to twice daily due to dosing   RTC in 3 months after infusions with labs prior     (CBC w/diff, CMP, Ferritin, Iron, B12)        I had an extensive discussion with the patient regarding the diagnosis and discussed the plan of therapy, including general considerations regarding side effects and outcomes. Pt understood and gave appropriate teach back about the plan of care. All questions were answered to the patient's satisfaction. The patient is instructed to contact us at any time if questions or problems arise. Thank you for the opportunity to participate in the care of this very pleasant patient.     Total time =30 minutes. 50% or more of this time was spent in counseling and/or coordination of care including reviewing medical  history/radiology/labs, examining patient, formulating outlined plan with team, and discussing plan with patient/family.    Anushka Snyder, APRN-CNP

## 2024-07-24 DIAGNOSIS — D50.8 OTHER IRON DEFICIENCY ANEMIA: ICD-10-CM

## 2024-07-24 DIAGNOSIS — D50.8 OTHER IRON DEFICIENCY ANEMIA: Primary | ICD-10-CM

## 2024-07-24 DIAGNOSIS — E61.1 IRON DEFICIENCY: ICD-10-CM

## 2024-07-24 RX ORDER — FAMOTIDINE 10 MG/ML
20 INJECTION INTRAVENOUS ONCE AS NEEDED
Status: CANCELLED | OUTPATIENT
Start: 2024-07-24

## 2024-07-24 RX ORDER — EPINEPHRINE 0.3 MG/.3ML
0.3 INJECTION SUBCUTANEOUS EVERY 5 MIN PRN
Status: CANCELLED | OUTPATIENT
Start: 2024-07-24

## 2024-07-24 RX ORDER — DIPHENHYDRAMINE HYDROCHLORIDE 50 MG/ML
50 INJECTION INTRAMUSCULAR; INTRAVENOUS AS NEEDED
Status: CANCELLED | OUTPATIENT
Start: 2024-07-24

## 2024-07-24 RX ORDER — ALBUTEROL SULFATE 0.83 MG/ML
3 SOLUTION RESPIRATORY (INHALATION) AS NEEDED
Status: CANCELLED | OUTPATIENT
Start: 2024-07-24

## 2024-07-25 ENCOUNTER — INFUSION (OUTPATIENT)
Dept: HEMATOLOGY/ONCOLOGY | Facility: CLINIC | Age: 69
End: 2024-07-25
Payer: COMMERCIAL

## 2024-07-25 VITALS
WEIGHT: 234.13 LBS | TEMPERATURE: 97.2 F | OXYGEN SATURATION: 96 % | DIASTOLIC BLOOD PRESSURE: 105 MMHG | RESPIRATION RATE: 16 BRPM | BODY MASS INDEX: 40.19 KG/M2 | SYSTOLIC BLOOD PRESSURE: 173 MMHG | HEART RATE: 78 BPM

## 2024-07-25 DIAGNOSIS — E61.1 IRON DEFICIENCY: ICD-10-CM

## 2024-07-25 DIAGNOSIS — D50.8 OTHER IRON DEFICIENCY ANEMIA: ICD-10-CM

## 2024-07-25 PROCEDURE — 96365 THER/PROPH/DIAG IV INF INIT: CPT

## 2024-07-25 PROCEDURE — 2500000004 HC RX 250 GENERAL PHARMACY W/ HCPCS (ALT 636 FOR OP/ED): Mod: JZ

## 2024-07-25 RX ORDER — HEPARIN SODIUM,PORCINE/PF 10 UNIT/ML
50 SYRINGE (ML) INTRAVENOUS AS NEEDED
OUTPATIENT
Start: 2024-07-25

## 2024-07-25 RX ORDER — ALBUTEROL SULFATE 0.83 MG/ML
3 SOLUTION RESPIRATORY (INHALATION) AS NEEDED
OUTPATIENT
Start: 2024-08-01

## 2024-07-25 RX ORDER — EPINEPHRINE 0.3 MG/.3ML
0.3 INJECTION SUBCUTANEOUS EVERY 5 MIN PRN
OUTPATIENT
Start: 2024-08-01

## 2024-07-25 RX ORDER — DIPHENHYDRAMINE HYDROCHLORIDE 50 MG/ML
50 INJECTION INTRAMUSCULAR; INTRAVENOUS AS NEEDED
OUTPATIENT
Start: 2024-08-01

## 2024-07-25 RX ORDER — HEPARIN 100 UNIT/ML
500 SYRINGE INTRAVENOUS AS NEEDED
OUTPATIENT
Start: 2024-07-25

## 2024-07-25 RX ORDER — FAMOTIDINE 10 MG/ML
20 INJECTION INTRAVENOUS ONCE AS NEEDED
OUTPATIENT
Start: 2024-08-01

## 2024-07-25 ASSESSMENT — PAIN SCALES - GENERAL: PAINLEVEL: 7

## 2024-07-29 ENCOUNTER — APPOINTMENT (OUTPATIENT)
Dept: HEMATOLOGY/ONCOLOGY | Facility: CLINIC | Age: 69
End: 2024-07-29
Payer: COMMERCIAL

## 2024-08-01 ENCOUNTER — INFUSION (OUTPATIENT)
Dept: HEMATOLOGY/ONCOLOGY | Facility: CLINIC | Age: 69
End: 2024-08-01
Payer: COMMERCIAL

## 2024-08-01 ENCOUNTER — APPOINTMENT (OUTPATIENT)
Dept: HEMATOLOGY/ONCOLOGY | Facility: CLINIC | Age: 69
End: 2024-08-01
Payer: COMMERCIAL

## 2024-08-01 VITALS
TEMPERATURE: 97.2 F | WEIGHT: 235.45 LBS | SYSTOLIC BLOOD PRESSURE: 139 MMHG | RESPIRATION RATE: 18 BRPM | HEART RATE: 86 BPM | BODY MASS INDEX: 40.42 KG/M2 | OXYGEN SATURATION: 96 % | DIASTOLIC BLOOD PRESSURE: 99 MMHG

## 2024-08-01 DIAGNOSIS — E61.1 IRON DEFICIENCY: ICD-10-CM

## 2024-08-01 DIAGNOSIS — D50.8 OTHER IRON DEFICIENCY ANEMIA: ICD-10-CM

## 2024-08-01 PROCEDURE — 96365 THER/PROPH/DIAG IV INF INIT: CPT

## 2024-08-01 PROCEDURE — 2500000004 HC RX 250 GENERAL PHARMACY W/ HCPCS (ALT 636 FOR OP/ED): Mod: JZ

## 2024-08-01 RX ORDER — HEPARIN 100 UNIT/ML
500 SYRINGE INTRAVENOUS AS NEEDED
OUTPATIENT
Start: 2024-08-01

## 2024-08-01 RX ORDER — EPINEPHRINE 0.3 MG/.3ML
0.3 INJECTION SUBCUTANEOUS EVERY 5 MIN PRN
OUTPATIENT
Start: 2024-08-01

## 2024-08-01 RX ORDER — FAMOTIDINE 10 MG/ML
20 INJECTION INTRAVENOUS ONCE AS NEEDED
OUTPATIENT
Start: 2024-08-01

## 2024-08-01 RX ORDER — HEPARIN SODIUM,PORCINE/PF 10 UNIT/ML
50 SYRINGE (ML) INTRAVENOUS AS NEEDED
OUTPATIENT
Start: 2024-08-01

## 2024-08-01 RX ORDER — DIPHENHYDRAMINE HYDROCHLORIDE 50 MG/ML
50 INJECTION INTRAMUSCULAR; INTRAVENOUS AS NEEDED
OUTPATIENT
Start: 2024-08-01

## 2024-08-01 RX ORDER — ALBUTEROL SULFATE 0.83 MG/ML
3 SOLUTION RESPIRATORY (INHALATION) AS NEEDED
OUTPATIENT
Start: 2024-08-01

## 2024-08-01 ASSESSMENT — PAIN SCALES - GENERAL: PAINLEVEL: 8

## 2024-08-05 ENCOUNTER — APPOINTMENT (OUTPATIENT)
Dept: HEMATOLOGY/ONCOLOGY | Facility: CLINIC | Age: 69
End: 2024-08-05
Payer: COMMERCIAL

## 2024-08-13 ENCOUNTER — OFFICE VISIT (OUTPATIENT)
Age: 69
End: 2024-08-13
Payer: COMMERCIAL

## 2024-08-13 VITALS
SYSTOLIC BLOOD PRESSURE: 122 MMHG | WEIGHT: 234.8 LBS | DIASTOLIC BLOOD PRESSURE: 60 MMHG | OXYGEN SATURATION: 97 % | BODY MASS INDEX: 40.3 KG/M2 | HEART RATE: 104 BPM

## 2024-08-13 DIAGNOSIS — N32.81 OAB (OVERACTIVE BLADDER): Primary | ICD-10-CM

## 2024-08-13 DIAGNOSIS — R39.15 URINARY URGENCY: ICD-10-CM

## 2024-08-13 LAB
POC APPEARANCE, URINE: ABNORMAL
POC BILIRUBIN, URINE: NEGATIVE
POC BLOOD, URINE: NEGATIVE
POC COLOR, URINE: ABNORMAL
POC GLUCOSE, URINE: NEGATIVE MG/DL
POC KETONES, URINE: NEGATIVE MG/DL
POC LEUKOCYTES, URINE: NEGATIVE
POC NITRITE,URINE: NEGATIVE
POC PH, URINE: 6 PH
POC PROTEIN, URINE: NEGATIVE MG/DL
POC SPECIFIC GRAVITY, URINE: 1.02
POC UROBILINOGEN, URINE: 0.2 EU/DL

## 2024-08-13 RX ORDER — OXYBUTYNIN CHLORIDE 5 MG/1
5 TABLET, EXTENDED RELEASE ORAL DAILY
Qty: 30 TABLET | Refills: 11 | Status: SHIPPED | OUTPATIENT
Start: 2024-08-13 | End: 2025-08-13

## 2024-08-13 ASSESSMENT — ENCOUNTER SYMPTOMS
DIARRHEA: 0
DIFFICULTY URINATING: 0
FREQUENCY: 1
HEMATURIA: 0
CONSTIPATION: 0
ABDOMINAL PAIN: 0
FEVER: 0
DYSURIA: 0
CHILLS: 0

## 2024-08-13 NOTE — PROGRESS NOTES
Subjective   Patient ID: Eloisa Boogie is a 68 y.o. female who presents for UTI (Urgency and pressure/ a little incontience).    UTI   Associated symptoms include frequency and urgency. Pertinent negatives include no chills or hematuria.      History is more consistent with UTI.  Urinalysis today is clear    Start oxybutynin 5 mg daily.    Review of Systems   Constitutional:  Negative for chills and fever.   Gastrointestinal:  Negative for abdominal pain, constipation and diarrhea.   Genitourinary:  Positive for frequency and urgency. Negative for difficulty urinating, dysuria and hematuria.       Objective   /60   Pulse 104   Wt 107 kg (234 lb 12.8 oz)   LMP  (LMP Unknown)   SpO2 97%   BMI 40.30 kg/m²     Physical Exam  Constitutional:       Appearance: Normal appearance.   HENT:      Head: Normocephalic and atraumatic.   Skin:     General: Skin is warm and dry.   Neurological:      General: No focal deficit present.      Mental Status: She is alert and oriented to person, place, and time.   Psychiatric:         Mood and Affect: Mood normal.         Behavior: Behavior normal.         Thought Content: Thought content normal.         Judgment: Judgment normal.         Assessment/Plan   Problem List Items Addressed This Visit             ICD-10-CM    OAB (overactive bladder) - Primary N32.81    Relevant Medications    oxybutynin XL (Ditropan-XL) 5 mg 24 hr tablet     Other Visit Diagnoses         Codes    Urinary urgency     R39.15    Relevant Orders    POCT UA Automated manually resulted (Completed)

## 2024-09-16 ENCOUNTER — LAB (OUTPATIENT)
Dept: LAB | Facility: LAB | Age: 69
End: 2024-09-16
Payer: COMMERCIAL

## 2024-09-16 DIAGNOSIS — D50.8 OTHER IRON DEFICIENCY ANEMIA: ICD-10-CM

## 2024-09-16 LAB
ALBUMIN SERPL BCP-MCNC: 4.3 G/DL (ref 3.4–5)
ALP SERPL-CCNC: 86 U/L (ref 33–136)
ALT SERPL W P-5'-P-CCNC: 15 U/L (ref 7–45)
ANION GAP SERPL CALC-SCNC: 13 MMOL/L (ref 10–20)
AST SERPL W P-5'-P-CCNC: 14 U/L (ref 9–39)
BASOPHILS # BLD AUTO: 0.04 X10*3/UL (ref 0–0.1)
BASOPHILS NFR BLD AUTO: 0.4 %
BILIRUB SERPL-MCNC: 0.3 MG/DL (ref 0–1.2)
BUN SERPL-MCNC: 17 MG/DL (ref 6–23)
CALCIUM SERPL-MCNC: 10.5 MG/DL (ref 8.6–10.3)
CHLORIDE SERPL-SCNC: 105 MMOL/L (ref 98–107)
CO2 SERPL-SCNC: 24 MMOL/L (ref 21–32)
CREAT SERPL-MCNC: 0.66 MG/DL (ref 0.5–1.05)
EGFRCR SERPLBLD CKD-EPI 2021: >90 ML/MIN/1.73M*2
EOSINOPHIL # BLD AUTO: 0.26 X10*3/UL (ref 0–0.7)
EOSINOPHIL NFR BLD AUTO: 2.7 %
ERYTHROCYTE [DISTWIDTH] IN BLOOD BY AUTOMATED COUNT: 16.9 % (ref 11.5–14.5)
FERRITIN SERPL-MCNC: 220 NG/ML (ref 8–150)
GLUCOSE SERPL-MCNC: 108 MG/DL (ref 74–99)
HCT VFR BLD AUTO: 43.4 % (ref 36–46)
HGB BLD-MCNC: 13.5 G/DL (ref 12–16)
IMM GRANULOCYTES # BLD AUTO: 0.05 X10*3/UL (ref 0–0.7)
IMM GRANULOCYTES NFR BLD AUTO: 0.5 % (ref 0–0.9)
IRON SATN MFR SERPL: 23 % (ref 25–45)
IRON SERPL-MCNC: 78 UG/DL (ref 35–150)
LYMPHOCYTES # BLD AUTO: 2.18 X10*3/UL (ref 1.2–4.8)
LYMPHOCYTES NFR BLD AUTO: 22.7 %
MCH RBC QN AUTO: 27.8 PG (ref 26–34)
MCHC RBC AUTO-ENTMCNC: 31.1 G/DL (ref 32–36)
MCV RBC AUTO: 90 FL (ref 80–100)
MONOCYTES # BLD AUTO: 0.69 X10*3/UL (ref 0.1–1)
MONOCYTES NFR BLD AUTO: 7.2 %
NEUTROPHILS # BLD AUTO: 6.37 X10*3/UL (ref 1.2–7.7)
NEUTROPHILS NFR BLD AUTO: 66.5 %
NRBC BLD-RTO: 0 /100 WBCS (ref 0–0)
PLATELET # BLD AUTO: 284 X10*3/UL (ref 150–450)
POTASSIUM SERPL-SCNC: 4.4 MMOL/L (ref 3.5–5.3)
PROT SERPL-MCNC: 6.4 G/DL (ref 6.4–8.2)
RBC # BLD AUTO: 4.85 X10*6/UL (ref 4–5.2)
SODIUM SERPL-SCNC: 138 MMOL/L (ref 136–145)
TIBC SERPL-MCNC: 334 UG/DL (ref 240–445)
UIBC SERPL-MCNC: 256 UG/DL (ref 110–370)
VIT B12 SERPL-MCNC: 703 PG/ML (ref 211–911)
WBC # BLD AUTO: 9.6 X10*3/UL (ref 4.4–11.3)

## 2024-09-16 PROCEDURE — 80053 COMPREHEN METABOLIC PANEL: CPT

## 2024-09-16 PROCEDURE — 82607 VITAMIN B-12: CPT

## 2024-09-16 PROCEDURE — 83540 ASSAY OF IRON: CPT

## 2024-09-16 PROCEDURE — 85025 COMPLETE CBC W/AUTO DIFF WBC: CPT

## 2024-09-16 PROCEDURE — 82728 ASSAY OF FERRITIN: CPT

## 2024-09-16 PROCEDURE — 83550 IRON BINDING TEST: CPT

## 2024-09-16 PROCEDURE — 36415 COLL VENOUS BLD VENIPUNCTURE: CPT

## 2024-09-19 NOTE — PROGRESS NOTES
Patient ID: Eloisa Boogie is a 68 y.o. female.    Subjective    HPI    Patient is a 67 yo female with a PMH of ALCIRA, Anemia, Hiatal Hernia and was referred to benign hematology for consultation of Anemia.     Recent lab work revealed her hg remains low. Patient reports she has struggle with anemia and iron deficiency since her hip replacement in 2016. Patient had a colonoscopy in 2023 to rule out GI bleeding. Coloscopy revealed two polyps, otherwise no significant findings. GI ordered iron infusions, and then started her on oral iron and B12 in hopes to manage iron parameters.       Today, patient presents for initial consultation. Appetite is decent for patient, 1-2 meals on average. Energy is poor/fatigues easily. Since having COVID in 2022  has been struggling with rapid heart beat and increased VELASCO. No abnormal bleeding or bruising. No GI issues. Denies any hematochezia or melena. No urinary issues, no UTIs, dysuria, hematuria. No fevers, night sweats, weight is stable. No recurring infections. No bone pain. Episodes of lightheadedness, and dizziness. Great dentition, left raised rash near elbow, dry skin and hair. No PICA.     Interval History 9/20/24     After iron infusion, had urinary incontinence resolved with oxybutynin    Energy is decent, repainting her house, remains working in her gardens  Appetite remains low, tends to eat once a day, unchanged  Increased allergies, started using inhalers several times a week, normal for this time of year  Denies any abnormal bleeding or bruising  No GI issues, no hematuria or melena   Started taking a probiotic, working well   No recent falls  Chronic Myalgias/Arthralgias     Remains on a vitamin with B12/Iron/Folate/Vitamin C      She overall feels well today      up to date on cancer screenings                 Objective    BSA: There is no height or weight on file to calculate BSA.  LMP  (LMP Unknown)      Physical Exam  Vitals and nursing note reviewed.    Constitutional:       Appearance: Normal appearance.   HENT:      Head: Normocephalic and atraumatic.      Right Ear: Tympanic membrane normal.      Left Ear: Tympanic membrane normal.      Nose: Nose normal.      Mouth/Throat:      Mouth: Mucous membranes are moist.      Pharynx: Oropharynx is clear.   Eyes:      General: No scleral icterus.     Extraocular Movements: Extraocular movements intact.      Conjunctiva/sclera: Conjunctivae normal.   Cardiovascular:      Rate and Rhythm: Normal rate and regular rhythm.      Pulses: Normal pulses.      Heart sounds: Normal heart sounds.   Pulmonary:      Effort: Pulmonary effort is normal.      Breath sounds: Normal breath sounds.   Abdominal:      Palpations: Abdomen is soft.   Musculoskeletal:         General: Swelling present. Normal range of motion.      Cervical back: Normal range of motion.   Skin:     General: Skin is warm and dry.   Neurological:      General: No focal deficit present.      Mental Status: She is alert and oriented to person, place, and time.   Psychiatric:         Mood and Affect: Mood normal.         Behavior: Behavior normal.         Thought Content: Thought content normal.         Judgment: Judgment normal.         Performance Status:  Symptomatic; fully ambulatory      Assessment/Plan        According to our records patient has been anemic since 4/14/22 with a Hg ranging from 9.1-11.6, remainder of CBC has remained stable with the exception of 4/14/22- both WBC (13.2) and plts (471) were elevated. Since 2022, patients iron parameters have ranged as follows- Fe 15-25, Fe sat 3-6%, Ferritin  as of 3/01/24 was 24., B12 stable at 884.     Patient is currently taking oral iron and B12 in hopes to maintain her iron level. Patient reports poor energy/easily fatigued. Appetite is stable for patient. Continues to struggle with rapid heart beat and increased VELASCO. Episodes of dizziness/lightheadedness. Denies any abnormal bleeding or bruising. No GI or  "urinary issues. Denies any overt bleeding. No constitutional \"B\" symptoms reported.      Discussed continuing or iron vs iron infusions. Patient is currently taking oral iron, with little to no change in her labs. Recommendation at this time is to proceed with IV Iron Infusions. Patient is agreeable.      Discussed cardiac concerns with regard to rapid heart beat and increased VELASCO. Discussed possibly due to iron deficiency, however would consider cardiology evaluation.       Discussed possible etiologies including multifactorial, nutritional deficiencies, anemia of chronic disease, malabsorption, hemopathy, medications, bleeding, malignancy, etc. Will start hematological workup today.      7/19/24:     Iron deficiency anemia:  -Her anemia has corrected. Hg 12.1.  Remains iron deficient. Fe 35, Fe sat 10%, Ferritin 56, most likely caused by intestinal malabsorption.  - B12 is stable at 801  - Continues on oral supplement with iron (28mg),  B12 (8mcq), Folate (400mcq), Vitamin C (60 mg) daily  Encouraged patient to take 1 tablet twice daily.   - Recommendation repeat course of IV iron sucrose (venofer). I reviewed treatment with patient. Patient is agreeable to proceed accordingly. Orders have been placed, accordingly.  -follow up in 3 months with labs prior to appointment     9/20/24  - Hg is stable at 13.5, remainder of CBC is stable  - Iron parameters have improved Fe 78, Fe Sat 23%, Ferritin 220, B12 703  - remains taking oral B12/Folate and Iron w/Vitamin C, tolerating supplements well  - denies any abnormal bleeding or bruising  - will continue to monitor labs, continue on above vitamins as same        Plan:  Continue on B12/Folate/Iron/Vitamin C daily  Labs have improved   RTC in 3 months with labs prior     (CBC w/diff, CMP, Ferritin, Folate, Iron Panel, B12)        I had an extensive discussion with the patient regarding the diagnosis and discussed the plan of therapy, including general considerations " regarding side effects and outcomes. Pt understood and gave appropriate teach back about the plan of care. All questions were answered to the patient's satisfaction. The patient is instructed to contact us at any time if questions or problems arise. Thank you for the opportunity to participate in the care of this very pleasant patient.     Total time =20 minutes. 50% or more of this time was spent in counseling and/or coordination of care including reviewing medical history/radiology/labs, examining patient, formulating outlined plan with team, and discussing plan with patient/family.            Anushka Snyder, PACHECO-CNP

## 2024-09-20 ENCOUNTER — OFFICE VISIT (OUTPATIENT)
Dept: HEMATOLOGY/ONCOLOGY | Facility: CLINIC | Age: 69
End: 2024-09-20
Payer: COMMERCIAL

## 2024-09-20 VITALS
OXYGEN SATURATION: 95 % | WEIGHT: 238.6 LBS | DIASTOLIC BLOOD PRESSURE: 82 MMHG | RESPIRATION RATE: 16 BRPM | SYSTOLIC BLOOD PRESSURE: 122 MMHG | BODY MASS INDEX: 40.96 KG/M2 | TEMPERATURE: 97.3 F | HEART RATE: 110 BPM

## 2024-09-20 DIAGNOSIS — D50.8 OTHER IRON DEFICIENCY ANEMIA: ICD-10-CM

## 2024-09-20 PROCEDURE — 99213 OFFICE O/P EST LOW 20 MIN: CPT

## 2024-09-20 PROCEDURE — 1159F MED LIST DOCD IN RCRD: CPT

## 2024-09-20 PROCEDURE — 1125F AMNT PAIN NOTED PAIN PRSNT: CPT

## 2024-09-20 RX ORDER — ALBUTEROL SULFATE 90 UG/1
2 INHALANT RESPIRATORY (INHALATION) EVERY 6 HOURS PRN
COMMUNITY

## 2024-09-20 ASSESSMENT — PAIN SCALES - GENERAL: PAINLEVEL: 8

## 2024-09-20 NOTE — PROGRESS NOTES
Pt to get labs at the hosp 12/16  Rtc 12/20 11am for CNP visit  Reviewed AVS with patient- patient verbalizes understanding

## 2024-09-20 NOTE — PATIENT INSTRUCTIONS
Continue on B12/Folate/Iron/Vitamin C daily  Labs have improved   RTC in 3 months with labs prior     (CBC w/diff, CMP, Ferritin, Folate, Iron Panel, B12)

## 2024-09-26 ENCOUNTER — TELEPHONE (OUTPATIENT)
Age: 69
End: 2024-09-26
Payer: COMMERCIAL

## 2024-09-26 DIAGNOSIS — U07.1 COVID-19: Primary | ICD-10-CM

## 2024-09-26 RX ORDER — BENZONATATE 100 MG/1
100 CAPSULE ORAL 3 TIMES DAILY PRN
Qty: 42 CAPSULE | Refills: 1 | Status: SHIPPED | OUTPATIENT
Start: 2024-09-26 | End: 2024-10-26

## 2024-09-26 RX ORDER — NIRMATRELVIR AND RITONAVIR 300-100 MG
3 KIT ORAL 2 TIMES DAILY
Qty: 30 TABLET | Refills: 0 | Status: SHIPPED | OUTPATIENT
Start: 2024-09-26 | End: 2024-10-01

## 2024-09-26 NOTE — TELEPHONE ENCOUNTER
Patient states that she took a Covid test this morning and it was positive. She is requesting Plaxlovid be sent to [facility].    She is also requesting more tessalon pearles.

## 2024-10-14 ENCOUNTER — TELEPHONE (OUTPATIENT)
Age: 69
End: 2024-10-14
Payer: COMMERCIAL

## 2024-10-14 DIAGNOSIS — N32.81 OAB (OVERACTIVE BLADDER): Primary | ICD-10-CM

## 2024-10-14 RX ORDER — OXYBUTYNIN CHLORIDE 5 MG/1
5 TABLET, EXTENDED RELEASE ORAL DAILY
Qty: 90 TABLET | Refills: 3 | Status: SHIPPED | OUTPATIENT
Start: 2024-10-14 | End: 2025-10-14

## 2024-10-14 NOTE — TELEPHONE ENCOUNTER
Patient called into office in regards to Oxybutynin prescription. Patient tried to get a refill but pharmacy stated they didn't have a prescription. It looks like the prescription was discontinued on our end back in September but patient stated she is still taking it. Can we please resend a prescription to [facility] in [place]? Thank you.

## 2024-11-04 ENCOUNTER — TELEPHONE (OUTPATIENT)
Age: 69
End: 2024-11-04
Payer: COMMERCIAL

## 2024-11-04 DIAGNOSIS — N39.3 STRESS INCONTINENCE IN FEMALE: ICD-10-CM

## 2024-11-04 NOTE — TELEPHONE ENCOUNTER
"PATIENT CALLED TO REPORT SHE FEEL LIKE SHE HAS \"LOSS OF MUSCLE CONTROL OVER BLADDER\". URINARY LEAKING WHEN GOING FROM SITTING TO STANDING. REQUEST REFERRAL TO UROLOGY. PROPOSED.   "

## 2024-12-09 ENCOUNTER — APPOINTMENT (OUTPATIENT)
Dept: UROLOGY | Facility: CLINIC | Age: 69
End: 2024-12-09
Payer: COMMERCIAL

## 2024-12-09 VITALS — BODY MASS INDEX: 39.82 KG/M2 | RESPIRATION RATE: 16 BRPM | WEIGHT: 232 LBS

## 2024-12-09 DIAGNOSIS — N39.3 STRESS INCONTINENCE IN FEMALE: ICD-10-CM

## 2024-12-09 DIAGNOSIS — N28.1 BILATERAL RENAL CYSTS: ICD-10-CM

## 2024-12-09 DIAGNOSIS — R35.1 NOCTURIA: ICD-10-CM

## 2024-12-09 LAB
POC APPEARANCE, URINE: CLEAR
POC BILIRUBIN, URINE: NEGATIVE
POC BLOOD, URINE: NEGATIVE
POC COLOR, URINE: YELLOW
POC GLUCOSE, URINE: NEGATIVE MG/DL
POC KETONES, URINE: NEGATIVE MG/DL
POC LEUKOCYTES, URINE: ABNORMAL
POC NITRITE,URINE: NEGATIVE
POC PH, URINE: 6 PH
POC PROTEIN, URINE: NEGATIVE MG/DL
POC SPECIFIC GRAVITY, URINE: >=1.03
POC UROBILINOGEN, URINE: 0.2 EU/DL

## 2024-12-09 PROCEDURE — 99203 OFFICE O/P NEW LOW 30 MIN: CPT | Performed by: UROLOGY

## 2024-12-09 PROCEDURE — 1036F TOBACCO NON-USER: CPT | Performed by: UROLOGY

## 2024-12-09 PROCEDURE — 81003 URINALYSIS AUTO W/O SCOPE: CPT | Performed by: UROLOGY

## 2024-12-09 PROCEDURE — 1159F MED LIST DOCD IN RCRD: CPT | Performed by: UROLOGY

## 2024-12-09 ASSESSMENT — ENCOUNTER SYMPTOMS
COUGH: 0
PSYCHIATRIC NEGATIVE: 1
EYES NEGATIVE: 1
FEVER: 0
ALLERGIC/IMMUNOLOGIC NEGATIVE: 1
CHILLS: 0
NAUSEA: 0
ENDOCRINE NEGATIVE: 1
DIFFICULTY URINATING: 0
SHORTNESS OF BREATH: 0

## 2024-12-09 NOTE — PROGRESS NOTES
Subjective   Patient ID: Eloisa Boogie is a 69 y.o. female.    HPI  Patient is here to establish for urinary incontinence. She is currently taking Oxybutynin 5mg daily, she did take 2 a day yesterday and was more helpful. She does complain of significant dryness. Some frequency and urgency but improved with medication. . No hematuria, No dysuria. Nocturia x 3, depending on fluid intake. CT from 3/23 showed bilateral renal cysts.      Review of Systems   Constitutional:  Negative for chills and fever.   HENT: Negative.     Eyes: Negative.    Respiratory:  Negative for cough and shortness of breath.    Cardiovascular:  Negative for chest pain and leg swelling.   Gastrointestinal:  Negative for nausea.   Endocrine: Negative.    Genitourinary:  Negative for difficulty urinating.        Negative except for documented in HPI   Allergic/Immunologic: Negative.    Neurological:         Alert & oriented X 3   Hematological:         Denies blood thinners   Psychiatric/Behavioral: Negative.         Objective   Physical Exam  Vitals and nursing note reviewed.   Pulmonary:      Effort: Pulmonary effort is normal.   Abdominal:      Palpations: Abdomen is soft.      Tenderness: There is no abdominal tenderness.   Genitourinary:     Comments: Kidneys non palpable bilaterally  Bladder non palpable or tender  Neurological:      Mental Status: She is alert.         Assessment/Plan   Diagnoses and all orders for this visit:  Stress incontinence in female  -     Referral to Urology  Nocturia    Treatment options for LUTS reviewed  Hold ditropan due to side effects  Gemtesa Samples given  Discussed timed voiding. Discussed fluid and caffeine intake  Lifestyle change to help prevent UTIs discussed. Encouraged fluid intake.  UA reviewed  Past CT and Renal U/S reviewed-renal cysts    F/u 1 month med review

## 2024-12-16 ENCOUNTER — LAB (OUTPATIENT)
Dept: LAB | Facility: LAB | Age: 69
End: 2024-12-16
Payer: COMMERCIAL

## 2024-12-16 DIAGNOSIS — D50.8 OTHER IRON DEFICIENCY ANEMIA: ICD-10-CM

## 2024-12-16 LAB
ALBUMIN SERPL BCP-MCNC: 4.1 G/DL (ref 3.4–5)
ALP SERPL-CCNC: 88 U/L (ref 33–136)
ALT SERPL W P-5'-P-CCNC: 15 U/L (ref 7–45)
ANION GAP SERPL CALC-SCNC: 11 MMOL/L (ref 10–20)
AST SERPL W P-5'-P-CCNC: 14 U/L (ref 9–39)
BASOPHILS # BLD AUTO: 0.07 X10*3/UL (ref 0–0.1)
BASOPHILS NFR BLD AUTO: 0.8 %
BILIRUB SERPL-MCNC: 0.3 MG/DL (ref 0–1.2)
BUN SERPL-MCNC: 23 MG/DL (ref 6–23)
CALCIUM SERPL-MCNC: 10.1 MG/DL (ref 8.6–10.3)
CHLORIDE SERPL-SCNC: 105 MMOL/L (ref 98–107)
CO2 SERPL-SCNC: 27 MMOL/L (ref 21–32)
CREAT SERPL-MCNC: 0.68 MG/DL (ref 0.5–1.05)
EGFRCR SERPLBLD CKD-EPI 2021: >90 ML/MIN/1.73M*2
EOSINOPHIL # BLD AUTO: 0.12 X10*3/UL (ref 0–0.7)
EOSINOPHIL NFR BLD AUTO: 1.3 %
ERYTHROCYTE [DISTWIDTH] IN BLOOD BY AUTOMATED COUNT: 13.9 % (ref 11.5–14.5)
FERRITIN SERPL-MCNC: 146 NG/ML (ref 8–150)
FOLATE SERPL-MCNC: 11.9 NG/ML
GLUCOSE SERPL-MCNC: 65 MG/DL (ref 74–99)
HCT VFR BLD AUTO: 45.6 % (ref 36–46)
HGB BLD-MCNC: 13.8 G/DL (ref 12–16)
IMM GRANULOCYTES # BLD AUTO: 0.07 X10*3/UL (ref 0–0.7)
IMM GRANULOCYTES NFR BLD AUTO: 0.8 % (ref 0–0.9)
IRON SATN MFR SERPL: 14 % (ref 25–45)
IRON SERPL-MCNC: 48 UG/DL (ref 35–150)
LYMPHOCYTES # BLD AUTO: 2.12 X10*3/UL (ref 1.2–4.8)
LYMPHOCYTES NFR BLD AUTO: 23 %
MCH RBC QN AUTO: 27.9 PG (ref 26–34)
MCHC RBC AUTO-ENTMCNC: 30.3 G/DL (ref 32–36)
MCV RBC AUTO: 92 FL (ref 80–100)
MONOCYTES # BLD AUTO: 1.15 X10*3/UL (ref 0.1–1)
MONOCYTES NFR BLD AUTO: 12.5 %
NEUTROPHILS # BLD AUTO: 5.69 X10*3/UL (ref 1.2–7.7)
NEUTROPHILS NFR BLD AUTO: 61.6 %
NRBC BLD-RTO: 0 /100 WBCS (ref 0–0)
PLATELET # BLD AUTO: 294 X10*3/UL (ref 150–450)
POTASSIUM SERPL-SCNC: 4.6 MMOL/L (ref 3.5–5.3)
PROT SERPL-MCNC: 6.8 G/DL (ref 6.4–8.2)
RBC # BLD AUTO: 4.94 X10*6/UL (ref 4–5.2)
SODIUM SERPL-SCNC: 138 MMOL/L (ref 136–145)
TIBC SERPL-MCNC: 334 UG/DL (ref 240–445)
UIBC SERPL-MCNC: 286 UG/DL (ref 110–370)
VIT B12 SERPL-MCNC: 566 PG/ML (ref 211–911)
WBC # BLD AUTO: 9.2 X10*3/UL (ref 4.4–11.3)

## 2024-12-16 PROCEDURE — 83540 ASSAY OF IRON: CPT

## 2024-12-16 PROCEDURE — 82746 ASSAY OF FOLIC ACID SERUM: CPT

## 2024-12-16 PROCEDURE — 85025 COMPLETE CBC W/AUTO DIFF WBC: CPT

## 2024-12-16 PROCEDURE — 36415 COLL VENOUS BLD VENIPUNCTURE: CPT

## 2024-12-16 PROCEDURE — 82607 VITAMIN B-12: CPT

## 2024-12-16 PROCEDURE — 83550 IRON BINDING TEST: CPT

## 2024-12-16 PROCEDURE — 80053 COMPREHEN METABOLIC PANEL: CPT

## 2024-12-16 PROCEDURE — 82728 ASSAY OF FERRITIN: CPT

## 2024-12-20 ENCOUNTER — OFFICE VISIT (OUTPATIENT)
Dept: HEMATOLOGY/ONCOLOGY | Facility: CLINIC | Age: 69
End: 2024-12-20
Payer: COMMERCIAL

## 2024-12-20 VITALS
DIASTOLIC BLOOD PRESSURE: 80 MMHG | OXYGEN SATURATION: 95 % | BODY MASS INDEX: 40.13 KG/M2 | HEART RATE: 110 BPM | TEMPERATURE: 96.4 F | WEIGHT: 233.8 LBS | SYSTOLIC BLOOD PRESSURE: 138 MMHG | RESPIRATION RATE: 16 BRPM

## 2024-12-20 DIAGNOSIS — D50.8 OTHER IRON DEFICIENCY ANEMIA: ICD-10-CM

## 2024-12-20 DIAGNOSIS — N39.3 STRESS INCONTINENCE IN FEMALE: Primary | ICD-10-CM

## 2024-12-20 DIAGNOSIS — R35.1 NOCTURIA: ICD-10-CM

## 2024-12-20 PROCEDURE — 99213 OFFICE O/P EST LOW 20 MIN: CPT

## 2024-12-20 PROCEDURE — 1159F MED LIST DOCD IN RCRD: CPT

## 2024-12-20 PROCEDURE — 1125F AMNT PAIN NOTED PAIN PRSNT: CPT

## 2024-12-20 RX ORDER — MIRABEGRON 25 MG/1
25 TABLET, FILM COATED, EXTENDED RELEASE ORAL DAILY
COMMUNITY

## 2024-12-20 ASSESSMENT — PAIN SCALES - GENERAL: PAINLEVEL_OUTOF10: 6

## 2024-12-20 NOTE — PATIENT INSTRUCTIONS
Discussed and Reviewed medical history  Reviewed labs- Hemoglobin remains stable, iron parameters remain stable  Encouraged to try [place] Multivitamins with extra Iron  Referral to Uro/Gyn   Repeat labs prior to follow-up  Return to see APRN in 3 months

## 2024-12-20 NOTE — PROGRESS NOTES
Patient ID: Eloisa Boogie is a 69 y.o. female.    Subjective    HPI    Patient is a 67 yo female with a PMH of ALCIRA, Anemia, Hiatal Hernia and was referred to benign hematology for consultation of Anemia.     Recent lab work revealed her hg remains low. Patient reports she has struggle with anemia and iron deficiency since her hip replacement in 2016. Patient had a colonoscopy in 2023 to rule out GI bleeding. Coloscopy revealed two polyps, otherwise no significant findings. GI ordered iron infusions, and then started her on oral iron and B12 in hopes to manage iron parameters.       Today, patient presents for initial consultation. Appetite is decent for patient, 1-2 meals on average. Energy is poor/fatigues easily. Since having COVID in 2022  has been struggling with rapid heart beat and increased VELASCO. No abnormal bleeding or bruising. No GI issues. Denies any hematochezia or melena. No urinary issues, no UTIs, dysuria, hematuria. No fevers, night sweats, weight is stable. No recurring infections. No bone pain. Episodes of lightheadedness, and dizziness. Great dentition, left raised rash near elbow, dry skin and hair. No PICA.     Interval History 12/20/24     After iron infusion, had urinary incontinence resolved with medication  Patient is currently being managed by urology for urinary incontinence.   She reports medications and food (sugars) impact her bladder and increase incontinence  Currently it is more manageable than it was, but still feels as though it could be better       Energy is decent, repainting her house, remains working in her gardens  Appetite remains low, tends to eat once a day, unchanged  Increased allergies, started using inhalers several times a week, normal for this time of year  Denies any abnormal bleeding or bruising  No GI issues, no hematuria or melena   Started taking a probiotic, working well   No recent falls  Chronic Myalgias/Arthralgias     Remains on a vitamin with  B12/Iron/Folate/Vitamin C      She overall feels well today      up to date on cancer screenings  Objective    BSA: 2.19 meters squared  /80   Pulse 110   Temp 35.8 °C (96.4 °F) (Skin)   Resp 16   Wt 106 kg (233 lb 12.8 oz)   LMP  (LMP Unknown)   SpO2 95%   BMI 40.13 kg/m²      Physical Exam  Vitals and nursing note reviewed.   Constitutional:       Appearance: Normal appearance.   HENT:      Head: Normocephalic and atraumatic.      Nose: Nose normal.      Mouth/Throat:      Mouth: Mucous membranes are moist.      Pharynx: Oropharynx is clear.   Eyes:      General: No scleral icterus.     Extraocular Movements: Extraocular movements intact.      Conjunctiva/sclera: Conjunctivae normal.   Cardiovascular:      Rate and Rhythm: Normal rate and regular rhythm.      Pulses: Normal pulses.      Heart sounds: Normal heart sounds.   Pulmonary:      Effort: Pulmonary effort is normal.      Breath sounds: Normal breath sounds.   Abdominal:      Palpations: Abdomen is soft.   Musculoskeletal:         General: Normal range of motion.      Cervical back: Normal range of motion.   Skin:     General: Skin is warm.   Neurological:      General: No focal deficit present.      Mental Status: She is alert and oriented to person, place, and time.   Psychiatric:         Mood and Affect: Mood normal.         Behavior: Behavior normal.         Thought Content: Thought content normal.         Performance Status:  Asymptomatic      Assessment/Plan        According to our records patient has been anemic since 4/14/22 with a Hg ranging from 9.1-11.6, remainder of CBC has remained stable with the exception of 4/14/22- both WBC (13.2) and plts (471) were elevated. Since 2022, patients iron parameters have ranged as follows- Fe 15-25, Fe sat 3-6%, Ferritin  as of 3/01/24 was 24., B12 stable at 884.     Patient is currently taking oral iron and B12 in hopes to maintain her iron level. Patient reports poor energy/easily fatigued.  "Appetite is stable for patient. Continues to struggle with rapid heart beat and increased VELASCO. Episodes of dizziness/lightheadedness. Denies any abnormal bleeding or bruising. No GI or urinary issues. Denies any overt bleeding. No constitutional \"B\" symptoms reported.      Discussed continuing or iron vs iron infusions. Patient is currently taking oral iron, with little to no change in her labs. Recommendation at this time is to proceed with IV Iron Infusions. Patient is agreeable.      Discussed cardiac concerns with regard to rapid heart beat and increased VELASCO. Discussed possibly due to iron deficiency, however would consider cardiology evaluation.       Discussed possible etiologies including multifactorial, nutritional deficiencies, anemia of chronic disease, malabsorption, hemopathy, medications, bleeding, malignancy, etc. Will start hematological workup today.      7/19/24:     Iron deficiency anemia:  -Her anemia has corrected. Hg 12.1.  Remains iron deficient. Fe 35, Fe sat 10%, Ferritin 56, most likely caused by intestinal malabsorption.  - B12 is stable at 801  - Continues on oral supplement with iron (28mg),  B12 (8mcq), Folate (400mcq), Vitamin C (60 mg) daily  Encouraged patient to take 1 tablet twice daily.   - Recommendation repeat course of IV iron sucrose (venofer). I reviewed treatment with patient. Patient is agreeable to proceed accordingly. Orders have been placed, accordingly.  -follow up in 3 months with labs prior to appointment      12/20/24  - Hg is stable at 13.8, remainder of CBC is stable  - Iron parameters have improved Fe 48, Fe Sat 14%, Ferritin 146, B12 566  - remains taking oral B12/Folate and Iron w/Vitamin C, tolerating supplements well  - denies any abnormal bleeding or bruising  - discussed reserving IV Iron til she anemic as it causes increase in urinary incontinence   - discussed second opinion with Uro/Gyn for urinary incontinence, patient is agreeable   - will continue to " monitor labs, continue on above vitamins as same        Plan:  Discussed and Reviewed medical history  Reviewed labs- Hemoglobin remains stable, iron parameters remain stable  Encouraged to try [place] Multivitamins with extra Iron  Referral to Uro/Gyn   Repeat labs prior to follow-up  Return to see APRN in 3 months       (CBC w/diff, CMP, Ferritin, Folate, Iron Panel, B12)        I had an extensive discussion with the patient regarding the diagnosis and discussed the plan of therapy, including general considerations regarding side effects and outcomes. Pt understood and gave appropriate teach back about the plan of care. All questions were answered to the patient's satisfaction. The patient is instructed to contact us at any time if questions or problems arise. Thank you for the opportunity to participate in the care of this very pleasant patient.     Total time =20 minutes. 50% or more of this time was spent in counseling and/or coordination of care including reviewing medical history/radiology/labs, examining patient, formulating outlined plan with team, and discussing plan with patient/family.            Anushka Snyder, APRN-CNP

## 2024-12-20 NOTE — PROGRESS NOTES
Pt set up for CNP 3/20 1030 for CNP  Labs 3/17 at the hosp - before appt  Pt scheduled with UROGyn in March  Reviewed AVS with patient- patient verbalizes understanding

## 2024-12-30 ENCOUNTER — TELEPHONE (OUTPATIENT)
Dept: HEMATOLOGY/ONCOLOGY | Facility: CLINIC | Age: 69
End: 2024-12-30
Payer: COMMERCIAL

## 2025-01-06 ENCOUNTER — APPOINTMENT (OUTPATIENT)
Dept: UROLOGY | Facility: CLINIC | Age: 70
End: 2025-01-06
Payer: COMMERCIAL

## 2025-01-28 NOTE — PROGRESS NOTES
"Referred by: Anushka Snyder     PCP  Jose Taylor MD         CHIEF COMPLAINT:  urinary incontinence         HISTORY OF PRESENT ILLNESS:  This is a  69 y.o. y.o. female who presents bladder control issues. Urge incontinence since iron infusions.    Side effects from myrbetriq and gemtesa: dry eyes, dizziness    The following were reviewed to gain additional history:  External notes: Anushka Snyder note 12/20/24, urinary incontinence, particularly with bladder triggers  Dr. Hernández note 12/9/24, stress urinary incontinence, gemtesa samples given, side effects from ditropan  Test results: Cr 0.68 12/16/24  No results found for: \"URINECULTURE\"    CT 1/20/25  FINDINGS:   Moderately large hiatal hernia     No free air.     Moderate sigmoid colonic diverticular change without wall thickening.     Distal small bowel stool and top-normal distension, trace reactive for perienteric fluid at right lower quadrant.  Mild concentric at terminal ileal wall thickening.  Terminal ileal lobe wall thickening.  Appendix is not visualized, presumed surgically absent.     Stable small hypoattenuant probable hepatic cysts versus hemangiomas.  Bilateral renal CIS, predominantly parapelvic.  No hydronephrosis.  No other solid or hollow visceral finding.  Unremarkable adrenals.  Stable cysts along the left canal and iliac neurovascular bundles suggesting lymphoceles.     Unremarkable adrenals.  No lymphadenopathy.  Surgically absent uterus.     Pelvic ultrasound 3/27/23  RIGHT ADNEXA:  Status post right oophorectomy.        LEFT ADNEXA:  A 2.8 x 2 x 2.4 cm echolucency is present at the adnexa. This would  correspond to a 1.5 cm hypodensity on the CT scan of 09/21/2022. This  would be consistent with a residual ovarian follicle, or paraovarian  or peritoneal cyst. Otherwise no discrete ovarian tissue.        CUL DE SAC:  No gross free fluid is seen in the pelvic cul-de-sac.     OTHER:  None significant.      Impression   1.  Left adnexal cyst as " described.              Specifically, she describes the following pelvic floor symptoms:          Prolapse: No       - Splinting to urinate: No       - Splinting for bowel movement/stool trapping: No              Incontinence:  Yes             Urge              Urinary Symptoms:       - Frequency:  Yes             # Voids:         - Nocturia: Yes             # Voids:         - Urgency:  Yes       - Incomplete emptying:  No       - Hesitancy:  No       - Pain with voiding:  No       - Excessive fluid intake: water throughout the day               History:       - Recurrent UTI:  No       - Hematuria:  No       - Stones:  No       - Kidney Disease:  No                  Bowel Symptoms:       - Regular: Yes       - Diarrhea:No       - Constipation: No       - Fecal Incontinence:  No       - Flatus Incontinence:  No       - Fecal urgency:   No    Past medical and surgical hx reviewed - pertinent for   PMH anemia, asthma  PSH PAT, ovarian cystectomy via pfannenstiel, hip replacement  Smoking history: denies        Gyn History:  - Postmenopause: Yes, s/p hyst  - HRT: No  - Pap up to date: Yes - s/p hyst   History of abnormal pap: No  - Sexually active:  Yes  Dyspareunia: No   Other issues: none  - Number of prior vaginal deliveries: 2   Number of prior operative deliveries: 0   Prior OASI? 0  - Number of prior c-sections: 0    - Colonoscopy up to date: Yes    OB History    No obstetric history on file.               PHYSICAL EXAMINATION:  No LMP recorded (lmp unknown). Patient has had a hysterectomy.  Body mass index is 40.44 kg/m².  /82   Pulse (!) 116   Temp 36 °C (96.8 °F) (Skin)   Resp 16   Wt 107 kg (235 lb 9.6 oz)   LMP  (LMP Unknown)   SpO2 96%   BMI 40.44 kg/m²   General Appearance: well appearing  Neuro: Alert and oriented   HEENT: mucous membranes moist, neck supple  Resp: No respiratory distress, normal work of breathing  MSK: normal range of motion, gait appropriate    Pelvic:  Genitourinary:  normal external genitalia, Bartholin's glands negative, Hortense's glands negative  Urethra: normal meatus, non-tender, no periurethral mass  Vaginal mucosa  normal  Cervix surgically absent  Uterus surgically absent  Adnexae  negative nontender, no masses  Atrophy negative    CST negative    POP-Q (in supine position):  No significant prolapse    Rectal: no hemorrhoids, fissures or masses    PVR (by Ultrasound): 113 ml         IMPRESSION AND PLAN:  Eloisa Boogie is a 69 y.o. who presents with OAB/UUI    OAB  - discussed etiology of OAB and potential management options  - reviewed bladder health recommendations (fluid intake, avoiding bladder triggers)  - discussed treatment options: PFPT, medications, PTNS, intradetrusor botox, SNM  - previously tried myrbetriq and gemtesa (intolerable side effects)  - oxybutynin without side effects but reviewed dementia risk (also not working very well for her)  - opting for trospium 60 mg ER daily (daily dosing to try to avoid side effects)  - reviewed if this does not work, likely will move on to third line OAB therapies (could consider vesicare if she is highly motivated to try additional medication)    All questions and concerns were answered and addressed.  The patient expressed understanding and agrees with the plan.     RTC 2 months for med check    1/29/2025

## 2025-01-29 ENCOUNTER — OFFICE VISIT (OUTPATIENT)
Dept: OBSTETRICS AND GYNECOLOGY | Facility: CLINIC | Age: 70
End: 2025-01-29
Payer: COMMERCIAL

## 2025-01-29 VITALS
DIASTOLIC BLOOD PRESSURE: 82 MMHG | HEART RATE: 116 BPM | RESPIRATION RATE: 16 BRPM | TEMPERATURE: 96.8 F | BODY MASS INDEX: 40.44 KG/M2 | SYSTOLIC BLOOD PRESSURE: 132 MMHG | OXYGEN SATURATION: 96 % | WEIGHT: 235.6 LBS

## 2025-01-29 DIAGNOSIS — N39.3 STRESS INCONTINENCE IN FEMALE: ICD-10-CM

## 2025-01-29 DIAGNOSIS — R30.0 DYSURIA: ICD-10-CM

## 2025-01-29 DIAGNOSIS — R35.1 NOCTURIA: ICD-10-CM

## 2025-01-29 DIAGNOSIS — N32.81 OAB (OVERACTIVE BLADDER): Primary | ICD-10-CM

## 2025-01-29 DIAGNOSIS — N39.41 URGE URINARY INCONTINENCE: ICD-10-CM

## 2025-01-29 PROCEDURE — 1126F AMNT PAIN NOTED NONE PRSNT: CPT | Performed by: STUDENT IN AN ORGANIZED HEALTH CARE EDUCATION/TRAINING PROGRAM

## 2025-01-29 PROCEDURE — 99214 OFFICE O/P EST MOD 30 MIN: CPT | Performed by: STUDENT IN AN ORGANIZED HEALTH CARE EDUCATION/TRAINING PROGRAM

## 2025-01-29 PROCEDURE — G2211 COMPLEX E/M VISIT ADD ON: HCPCS | Performed by: STUDENT IN AN ORGANIZED HEALTH CARE EDUCATION/TRAINING PROGRAM

## 2025-01-29 PROCEDURE — 1159F MED LIST DOCD IN RCRD: CPT | Performed by: STUDENT IN AN ORGANIZED HEALTH CARE EDUCATION/TRAINING PROGRAM

## 2025-01-29 PROCEDURE — 99204 OFFICE O/P NEW MOD 45 MIN: CPT | Performed by: STUDENT IN AN ORGANIZED HEALTH CARE EDUCATION/TRAINING PROGRAM

## 2025-01-29 RX ORDER — TROSPIUM CHLORIDE ER 60 MG/1
60 CAPSULE ORAL DAILY
Qty: 30 CAPSULE | Refills: 2 | Status: SHIPPED | OUTPATIENT
Start: 2025-01-29

## 2025-01-29 RX ORDER — OXYBUTYNIN CHLORIDE 5 MG/1
5 TABLET, EXTENDED RELEASE ORAL DAILY
COMMUNITY

## 2025-01-29 ASSESSMENT — PAIN SCALES - GENERAL: PAINLEVEL_OUTOF10: 0-NO PAIN

## 2025-02-01 LAB
APPEARANCE UR: ABNORMAL
BACTERIA #/AREA URNS HPF: ABNORMAL /HPF
BACTERIA UR CULT: ABNORMAL
BACTERIA UR CULT: ABNORMAL
BILIRUB UR QL STRIP: NEGATIVE
COLOR UR: ABNORMAL
GLUCOSE UR QL STRIP: NEGATIVE
HGB UR QL STRIP: ABNORMAL
HYALINE CASTS #/AREA URNS LPF: ABNORMAL /LPF
KETONES UR QL STRIP: ABNORMAL
LEUKOCYTE ESTERASE UR QL STRIP: ABNORMAL
NITRITE UR QL STRIP: NEGATIVE
PH UR STRIP: 5.5 [PH] (ref 5–8)
PROT UR QL STRIP: ABNORMAL
RBC #/AREA URNS HPF: ABNORMAL /HPF
SERVICE CMNT-IMP: ABNORMAL
SP GR UR STRIP: 1.02 (ref 1–1.03)
SQUAMOUS #/AREA URNS HPF: ABNORMAL /HPF
WBC #/AREA URNS HPF: ABNORMAL /HPF

## 2025-03-14 ENCOUNTER — LAB (OUTPATIENT)
Dept: LAB | Facility: HOSPITAL | Age: 70
End: 2025-03-14
Payer: COMMERCIAL

## 2025-03-14 DIAGNOSIS — R35.1 NOCTURIA: ICD-10-CM

## 2025-03-14 DIAGNOSIS — N39.3 STRESS INCONTINENCE IN FEMALE: ICD-10-CM

## 2025-03-14 DIAGNOSIS — D50.8 OTHER IRON DEFICIENCY ANEMIA: ICD-10-CM

## 2025-03-14 LAB
ALBUMIN SERPL BCP-MCNC: 4 G/DL (ref 3.4–5)
ALP SERPL-CCNC: 71 U/L (ref 33–136)
ALT SERPL W P-5'-P-CCNC: 19 U/L (ref 7–45)
ANION GAP SERPL CALC-SCNC: 14 MMOL/L (ref 10–20)
AST SERPL W P-5'-P-CCNC: 18 U/L (ref 9–39)
BASOPHILS # BLD AUTO: 0.05 X10*3/UL (ref 0–0.1)
BASOPHILS NFR BLD AUTO: 0.6 %
BILIRUB SERPL-MCNC: 0.3 MG/DL (ref 0–1.2)
BUN SERPL-MCNC: 19 MG/DL (ref 6–23)
CALCIUM SERPL-MCNC: 9.6 MG/DL (ref 8.6–10.3)
CHLORIDE SERPL-SCNC: 109 MMOL/L (ref 98–107)
CO2 SERPL-SCNC: 23 MMOL/L (ref 21–32)
CREAT SERPL-MCNC: 0.66 MG/DL (ref 0.5–1.05)
EGFRCR SERPLBLD CKD-EPI 2021: >90 ML/MIN/1.73M*2
EOSINOPHIL # BLD AUTO: 0.2 X10*3/UL (ref 0–0.7)
EOSINOPHIL NFR BLD AUTO: 2.5 %
ERYTHROCYTE [DISTWIDTH] IN BLOOD BY AUTOMATED COUNT: 14.9 % (ref 11.5–14.5)
FERRITIN SERPL-MCNC: 107 NG/ML (ref 8–150)
GLUCOSE SERPL-MCNC: 83 MG/DL (ref 74–99)
HCT VFR BLD AUTO: 40.6 % (ref 36–46)
HGB BLD-MCNC: 12.4 G/DL (ref 12–16)
HGB RETIC QN: 31 PG (ref 28–38)
IMM GRANULOCYTES # BLD AUTO: 0.03 X10*3/UL (ref 0–0.7)
IMM GRANULOCYTES NFR BLD AUTO: 0.4 % (ref 0–0.9)
IMMATURE RETIC FRACTION: 14.3 %
IRON SATN MFR SERPL: 11 % (ref 25–45)
IRON SERPL-MCNC: 37 UG/DL (ref 35–150)
LYMPHOCYTES # BLD AUTO: 2.23 X10*3/UL (ref 1.2–4.8)
LYMPHOCYTES NFR BLD AUTO: 27.5 %
MCH RBC QN AUTO: 27.9 PG (ref 26–34)
MCHC RBC AUTO-ENTMCNC: 30.5 G/DL (ref 32–36)
MCV RBC AUTO: 91 FL (ref 80–100)
MONOCYTES # BLD AUTO: 0.63 X10*3/UL (ref 0.1–1)
MONOCYTES NFR BLD AUTO: 7.8 %
NEUTROPHILS # BLD AUTO: 4.98 X10*3/UL (ref 1.2–7.7)
NEUTROPHILS NFR BLD AUTO: 61.2 %
NRBC BLD-RTO: 0 /100 WBCS (ref 0–0)
PLATELET # BLD AUTO: 268 X10*3/UL (ref 150–450)
POTASSIUM SERPL-SCNC: 4 MMOL/L (ref 3.5–5.3)
PROT SERPL-MCNC: 6.3 G/DL (ref 6.4–8.2)
RBC # BLD AUTO: 4.45 X10*6/UL (ref 4–5.2)
RETICS #: 0.08 X10*6/UL (ref 0.02–0.11)
RETICS/RBC NFR AUTO: 1.7 % (ref 0.5–2)
SODIUM SERPL-SCNC: 142 MMOL/L (ref 136–145)
TIBC SERPL-MCNC: 331 UG/DL (ref 240–445)
UIBC SERPL-MCNC: 294 UG/DL (ref 110–370)
WBC # BLD AUTO: 8.1 X10*3/UL (ref 4.4–11.3)

## 2025-03-14 PROCEDURE — 83550 IRON BINDING TEST: CPT

## 2025-03-14 PROCEDURE — 85045 AUTOMATED RETICULOCYTE COUNT: CPT

## 2025-03-14 PROCEDURE — 85025 COMPLETE CBC W/AUTO DIFF WBC: CPT

## 2025-03-14 PROCEDURE — 80053 COMPREHEN METABOLIC PANEL: CPT

## 2025-03-14 PROCEDURE — 82728 ASSAY OF FERRITIN: CPT

## 2025-03-14 PROCEDURE — 83540 ASSAY OF IRON: CPT

## 2025-03-14 PROCEDURE — 82607 VITAMIN B-12: CPT

## 2025-03-15 LAB — VIT B12 SERPL-MCNC: 747 PG/ML (ref 211–911)

## 2025-03-20 ENCOUNTER — OFFICE VISIT (OUTPATIENT)
Dept: HEMATOLOGY/ONCOLOGY | Facility: CLINIC | Age: 70
End: 2025-03-20
Payer: COMMERCIAL

## 2025-03-20 VITALS
TEMPERATURE: 96.4 F | SYSTOLIC BLOOD PRESSURE: 142 MMHG | HEART RATE: 78 BPM | WEIGHT: 237.9 LBS | DIASTOLIC BLOOD PRESSURE: 100 MMHG | RESPIRATION RATE: 20 BRPM | BODY MASS INDEX: 40.84 KG/M2

## 2025-03-20 DIAGNOSIS — N39.3 STRESS INCONTINENCE IN FEMALE: ICD-10-CM

## 2025-03-20 DIAGNOSIS — D50.8 OTHER IRON DEFICIENCY ANEMIA: ICD-10-CM

## 2025-03-20 DIAGNOSIS — R35.1 NOCTURIA: ICD-10-CM

## 2025-03-20 PROCEDURE — 1125F AMNT PAIN NOTED PAIN PRSNT: CPT

## 2025-03-20 PROCEDURE — 99213 OFFICE O/P EST LOW 20 MIN: CPT

## 2025-03-20 PROCEDURE — 1159F MED LIST DOCD IN RCRD: CPT

## 2025-03-20 ASSESSMENT — PAIN SCALES - GENERAL: PAINLEVEL_OUTOF10: 7

## 2025-03-20 NOTE — PATIENT INSTRUCTIONS
Discussed and reviewed medical history  Reviewed labs: Hemoglobin remains stable at 12.4, Iron parameters- Iron serum 37, Iron Saturation 11%, Ferritin 107, B12 747  Encouraged to stay on oral [place] with extra iron and Multivitamin with Folate/B12/Iron/Vitamin C  Will repeat labs in 3 months (CBC w/diff, CMP, Ferritin, Iron Panel, B12)  Return to see APRN in 6 months with labs prior  (CBC w/diff, CMP, Ferritin, Iron Panel, B12)

## 2025-03-20 NOTE — PROGRESS NOTES
Pt to get labs prior to next appt- at the [facility]  Rtc 9/18 1030 for CNP visit  Reviewed AVS with patient- patient verbalizes understanding

## 2025-03-20 NOTE — PROGRESS NOTES
Patient ID: Eloisa Boogie is a 69 y.o. female.    Subjective      HPI     Patient is a 69 yo female with a PMH of ALCIRA, Anemia, Hiatal Hernia and was referred to benign hematology for consultation of Anemia.     Recent lab work revealed her hg remains low. Patient reports she has struggle with anemia and iron deficiency since her hip replacement in 2016. Patient had a colonoscopy in 2023 to rule out GI bleeding. Coloscopy revealed two polyps, otherwise no significant findings. GI ordered iron infusions, and then started her on oral iron and B12 in hopes to manage iron parameters.       Today, patient presents for initial consultation. Appetite is decent for patient, 1-2 meals on average. Energy is poor/fatigues easily. Since having COVID in 2022  has been struggling with rapid heart beat and increased VELASCO. No abnormal bleeding or bruising. No GI issues. Denies any hematochezia or melena. No urinary issues, no UTIs, dysuria, hematuria. No fevers, night sweats, weight is stable. No recurring infections. No bone pain. Episodes of lightheadedness, and dizziness. Great dentition, left raised rash near elbow, dry skin and hair. No PICA.     Interval History 3/20/25     After iron infusion, had urinary incontinence resolved with medication  Patient is currently being managed by urology for urinary incontinence.   She reports medications and food (sugars) impact her bladder and increase incontinence  Currently it is more manageable, she is not taking anything at this time       BP is elevated at 142/100, she states it is always elevated at doctor's appointment   Recommended home monitoring     Energy is decent, repainting/wall-papering her house, remains working in her gardens  Appetite remains low, tends to eat once a day, unchanged  Uses inhalers as needed, allergies have been more manageable lately  Denies any abnormal bleeding or bruising  No GI issues, no hematuria or melena   Continues on a probiotic, working well   No  recent falls  Chronic Myalgias/Arthralgias     Remains on a vitamin with B12/Iron/Folate/Vitamin C   States she is feeling well today  No new clinical concerns      up to date on cancer screenings    Objective    BSA: There is no height or weight on file to calculate BSA.  LMP  (LMP Unknown)      Physical Exam  Vitals and nursing note reviewed.   Constitutional:       Appearance: Normal appearance.   HENT:      Head: Normocephalic and atraumatic.      Nose: Nose normal.      Mouth/Throat:      Mouth: Mucous membranes are moist.      Pharynx: Oropharynx is clear.   Eyes:      General: No scleral icterus.     Extraocular Movements: Extraocular movements intact.      Conjunctiva/sclera: Conjunctivae normal.   Cardiovascular:      Rate and Rhythm: Normal rate and regular rhythm.      Pulses: Normal pulses.      Heart sounds: Normal heart sounds.   Pulmonary:      Effort: Pulmonary effort is normal.      Breath sounds: Normal breath sounds.   Abdominal:      Palpations: Abdomen is soft.      Tenderness: There is no abdominal tenderness.   Musculoskeletal:         General: Normal range of motion.      Cervical back: Normal range of motion.   Skin:     General: Skin is warm and dry.   Neurological:      General: No focal deficit present.      Mental Status: She is alert and oriented to person, place, and time.   Psychiatric:         Mood and Affect: Mood normal.         Behavior: Behavior normal.         Thought Content: Thought content normal.         Judgment: Judgment normal.         Performance Status:  Asymptomatic      Assessment/Plan        According to our records patient has been anemic since 4/14/22 with a Hg ranging from 9.1-11.6, remainder of CBC has remained stable with the exception of 4/14/22- both WBC (13.2) and plts (471) were elevated. Since 2022, patients iron parameters have ranged as follows- Fe 15-25, Fe sat 3-6%, Ferritin  as of 3/01/24 was 24., B12 stable at 884.     Patient is currently taking oral  "iron and B12 in hopes to maintain her iron level. Patient reports poor energy/easily fatigued. Appetite is stable for patient. Continues to struggle with rapid heart beat and increased VELASCO. Episodes of dizziness/lightheadedness. Denies any abnormal bleeding or bruising. No GI or urinary issues. Denies any overt bleeding. No constitutional \"B\" symptoms reported.      Discussed continuing or iron vs iron infusions. Patient is currently taking oral iron, with little to no change in her labs. Recommendation at this time is to proceed with IV Iron Infusions. Patient is agreeable.      Discussed cardiac concerns with regard to rapid heart beat and increased VELASCO. Discussed possibly due to iron deficiency, however would consider cardiology evaluation.       Discussed possible etiologies including multifactorial, nutritional deficiencies, anemia of chronic disease, malabsorption, hemopathy, medications, bleeding, malignancy, etc. Will start hematological workup today.      7/19/24:     Iron deficiency anemia:  -Her anemia has corrected. Hg 12.1.  Remains iron deficient. Fe 35, Fe sat 10%, Ferritin 56, most likely caused by intestinal malabsorption.  - B12 is stable at 801  - Continues on oral supplement with iron (28mg),  B12 (8mcq), Folate (400mcq), Vitamin C (60 mg) daily  Encouraged patient to take 1 tablet twice daily.   - Recommendation repeat course of IV iron sucrose (venofer). I reviewed treatment with patient. Patient is agreeable to proceed accordingly. Orders have been placed, accordingly.  -follow up in 3 months with labs prior to appointment      3/20/25  - Hg is stable at 12.4, remainder of CBC is stable  - Iron parameters have improved Fe 37, Fe Sat 11%, Ferritin 146, B12 747  - remains taking oral B12/Folate and Iron w/Vitamin C, tolerating supplements well  - denies any clinical concerns/symptoms, no abnormal bleeding or bruising,   - discussed reserving IV Iron til she is anemic as it causes increase in " urinary incontinence   - will continue to monitor labs, continue on above vitamins as same        Plan:  Discussed and Reviewed medical history  Reviewed labs- Hemoglobin remains stable, iron parameters slightly trended down  Encouraged to stay on oral [place] with extra iron and Multivitamin with Folate/B12/Iron/Vitamin C  Will repeat labs in 3 months (CBC w/diff, CMP, Ferritin, Iron Panel, B12)  Return to see APRN in 6 months with labs prior  (CBC w/diff, CMP, Ferritin, Iron Panel, B12)    I had an extensive discussion with the patient regarding the diagnosis and discussed the plan of therapy, including general considerations regarding side effects and outcomes. Pt understood and gave appropriate teach back about the plan of care. All questions were answered to the patient's satisfaction. The patient is instructed to contact us at any time if questions or problems arise. Thank you for the opportunity to participate in the care of this very pleasant patient.     Total time =20 minutes. 50% or more of this time was spent in counseling and/or coordination of care including reviewing medical history/radiology/labs, examining patient, formulating outlined plan with team, and discussing plan with patient/family.          Anushka Snyder, APRN-CNP

## 2025-03-27 ENCOUNTER — APPOINTMENT (OUTPATIENT)
Dept: OBSTETRICS AND GYNECOLOGY | Facility: CLINIC | Age: 70
End: 2025-03-27
Payer: COMMERCIAL

## 2025-05-23 ENCOUNTER — TELEPHONE (OUTPATIENT)
Age: 70
End: 2025-05-23
Payer: COMMERCIAL
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
## 2025-05-23 RX ORDER — DOXYCYCLINE 100 MG/1
100 CAPSULE ORAL 2 TIMES DAILY
Qty: 20 CAPSULE | Refills: 0 | Status: SHIPPED | OUTPATIENT
Start: 2025-05-23 | End: 2025-06-02

## 2025-05-23 RX ORDER — DOXYCYCLINE 100 MG/1
CAPSULE ORAL
Qty: 2 CAPSULE | Refills: 0 | Status: SHIPPED | OUTPATIENT
Start: 2025-05-23

## 2025-05-23 NOTE — TELEPHONE ENCOUNTER
Patient bit by tick and has felt off, headaches and fatigue. Can we order lyme testing in PTF absence?

## 2025-05-27 LAB — B BURGDOR IGG+IGM SER QL IA: <=0.9 INDEX

## 2025-05-30 ENCOUNTER — TELEPHONE (OUTPATIENT)
Age: 70
End: 2025-05-30
Payer: COMMERCIAL

## 2025-05-30 NOTE — TELEPHONE ENCOUNTER
----- Message from Serafin Whittington sent at 5/28/2025  1:23 PM EDT -----  I believe this is a duplicate message but her Lyme disease is negative  ----- Message -----  From: [facility] [employer] [facility] Results In  Sent: 5/27/2025   6:30 PM EDT  To: Serafin Whittington MD    Pt notified.

## 2025-06-04 ENCOUNTER — LAB (OUTPATIENT)
Dept: LAB | Facility: HOSPITAL | Age: 70
End: 2025-06-04
Payer: COMMERCIAL

## 2025-06-04 LAB
ALBUMIN SERPL BCP-MCNC: 3.9 G/DL (ref 3.4–5)
ALP SERPL-CCNC: 72 U/L (ref 33–136)
ALT SERPL W P-5'-P-CCNC: 16 U/L (ref 7–45)
ANION GAP SERPL CALC-SCNC: 14 MMOL/L (ref 10–20)
AST SERPL W P-5'-P-CCNC: 16 U/L (ref 9–39)
BASOPHILS # BLD AUTO: 0.06 X10*3/UL (ref 0–0.1)
BASOPHILS NFR BLD AUTO: 0.8 %
BILIRUB SERPL-MCNC: 0.3 MG/DL (ref 0–1.2)
BUN SERPL-MCNC: 17 MG/DL (ref 6–23)
CALCIUM SERPL-MCNC: 9.8 MG/DL (ref 8.6–10.3)
CHLORIDE SERPL-SCNC: 108 MMOL/L (ref 98–107)
CO2 SERPL-SCNC: 22 MMOL/L (ref 21–32)
CREAT SERPL-MCNC: 0.69 MG/DL (ref 0.5–1.05)
EGFRCR SERPLBLD CKD-EPI 2021: >90 ML/MIN/1.73M*2
EOSINOPHIL # BLD AUTO: 0.19 X10*3/UL (ref 0–0.7)
EOSINOPHIL NFR BLD AUTO: 2.4 %
ERYTHROCYTE [DISTWIDTH] IN BLOOD BY AUTOMATED COUNT: 14.4 % (ref 11.5–14.5)
FERRITIN SERPL-MCNC: 55 NG/ML (ref 8–150)
GLUCOSE SERPL-MCNC: 110 MG/DL (ref 74–99)
HCT VFR BLD AUTO: 39.7 % (ref 36–46)
HGB BLD-MCNC: 12.3 G/DL (ref 12–16)
HGB RETIC QN: 32 PG (ref 28–38)
IMM GRANULOCYTES # BLD AUTO: 0.06 X10*3/UL (ref 0–0.7)
IMM GRANULOCYTES NFR BLD AUTO: 0.8 % (ref 0–0.9)
IMMATURE RETIC FRACTION: 23.7 %
IRON SATN MFR SERPL: 31 % (ref 25–45)
IRON SERPL-MCNC: 106 UG/DL (ref 35–150)
LYMPHOCYTES # BLD AUTO: 2.29 X10*3/UL (ref 1.2–4.8)
LYMPHOCYTES NFR BLD AUTO: 29.2 %
MCH RBC QN AUTO: 27.7 PG (ref 26–34)
MCHC RBC AUTO-ENTMCNC: 31 G/DL (ref 32–36)
MCV RBC AUTO: 89 FL (ref 80–100)
MONOCYTES # BLD AUTO: 0.62 X10*3/UL (ref 0.1–1)
MONOCYTES NFR BLD AUTO: 7.9 %
NEUTROPHILS # BLD AUTO: 4.63 X10*3/UL (ref 1.2–7.7)
NEUTROPHILS NFR BLD AUTO: 58.9 %
NRBC BLD-RTO: 0 /100 WBCS (ref 0–0)
PLATELET # BLD AUTO: 309 X10*3/UL (ref 150–450)
POTASSIUM SERPL-SCNC: 4.4 MMOL/L (ref 3.5–5.3)
PROT SERPL-MCNC: 6.1 G/DL (ref 6.4–8.2)
RBC # BLD AUTO: 4.44 X10*6/UL (ref 4–5.2)
RETICS #: 0.1 X10*6/UL (ref 0.02–0.11)
RETICS/RBC NFR AUTO: 2.2 % (ref 0.5–2)
SODIUM SERPL-SCNC: 140 MMOL/L (ref 136–145)
TIBC SERPL-MCNC: 347 UG/DL (ref 240–445)
UIBC SERPL-MCNC: 241 UG/DL (ref 110–370)
WBC # BLD AUTO: 7.9 X10*3/UL (ref 4.4–11.3)

## 2025-06-04 PROCEDURE — 85025 COMPLETE CBC W/AUTO DIFF WBC: CPT

## 2025-06-04 PROCEDURE — 85652 RBC SED RATE AUTOMATED: CPT

## 2025-06-04 PROCEDURE — 82728 ASSAY OF FERRITIN: CPT

## 2025-06-04 PROCEDURE — 80053 COMPREHEN METABOLIC PANEL: CPT

## 2025-06-04 PROCEDURE — 82607 VITAMIN B-12: CPT

## 2025-06-04 PROCEDURE — 86140 C-REACTIVE PROTEIN: CPT

## 2025-06-04 PROCEDURE — 85045 AUTOMATED RETICULOCYTE COUNT: CPT

## 2025-06-04 PROCEDURE — 83540 ASSAY OF IRON: CPT

## 2025-06-04 PROCEDURE — 83550 IRON BINDING TEST: CPT

## 2025-06-05 ENCOUNTER — OFFICE VISIT (OUTPATIENT)
Dept: HEMATOLOGY/ONCOLOGY | Facility: CLINIC | Age: 70
End: 2025-06-05
Payer: COMMERCIAL

## 2025-06-05 ENCOUNTER — OFFICE VISIT (OUTPATIENT)
Age: 70
End: 2025-06-05
Payer: COMMERCIAL

## 2025-06-05 VITALS
TEMPERATURE: 97.3 F | OXYGEN SATURATION: 96 % | HEART RATE: 99 BPM | DIASTOLIC BLOOD PRESSURE: 78 MMHG | SYSTOLIC BLOOD PRESSURE: 122 MMHG | WEIGHT: 236.2 LBS | BODY MASS INDEX: 40.54 KG/M2 | RESPIRATION RATE: 18 BRPM

## 2025-06-05 VITALS
HEART RATE: 113 BPM | HEIGHT: 64 IN | WEIGHT: 237 LBS | SYSTOLIC BLOOD PRESSURE: 124 MMHG | DIASTOLIC BLOOD PRESSURE: 80 MMHG | BODY MASS INDEX: 40.46 KG/M2 | OXYGEN SATURATION: 96 %

## 2025-06-05 DIAGNOSIS — M54.50 ACUTE MIDLINE LOW BACK PAIN WITHOUT SCIATICA: ICD-10-CM

## 2025-06-05 DIAGNOSIS — N39.3 STRESS INCONTINENCE IN FEMALE: ICD-10-CM

## 2025-06-05 DIAGNOSIS — E66.813 CLASS 3 SEVERE OBESITY DUE TO EXCESS CALORIES WITHOUT SERIOUS COMORBIDITY WITH BODY MASS INDEX (BMI) OF 40.0 TO 44.9 IN ADULT: ICD-10-CM

## 2025-06-05 DIAGNOSIS — R35.1 NOCTURIA: ICD-10-CM

## 2025-06-05 DIAGNOSIS — J45.30 MILD PERSISTENT ASTHMA WITHOUT COMPLICATION (HHS-HCC): ICD-10-CM

## 2025-06-05 DIAGNOSIS — D50.8 OTHER IRON DEFICIENCY ANEMIA: ICD-10-CM

## 2025-06-05 DIAGNOSIS — R53.83 FATIGUE, UNSPECIFIED TYPE: Primary | ICD-10-CM

## 2025-06-05 LAB
CRP SERPL-MCNC: 0.45 MG/DL
ERYTHROCYTE [SEDIMENTATION RATE] IN BLOOD BY WESTERGREN METHOD: 35 MM/H (ref 0–30)
VIT B12 SERPL-MCNC: 657 PG/ML (ref 211–911)

## 2025-06-05 PROCEDURE — 1159F MED LIST DOCD IN RCRD: CPT

## 2025-06-05 PROCEDURE — 1036F TOBACCO NON-USER: CPT | Performed by: FAMILY MEDICINE

## 2025-06-05 PROCEDURE — 99214 OFFICE O/P EST MOD 30 MIN: CPT | Performed by: FAMILY MEDICINE

## 2025-06-05 PROCEDURE — 1160F RVW MEDS BY RX/DR IN RCRD: CPT | Performed by: FAMILY MEDICINE

## 2025-06-05 PROCEDURE — 99213 OFFICE O/P EST LOW 20 MIN: CPT

## 2025-06-05 PROCEDURE — 1124F ACP DISCUSS-NO DSCNMKR DOCD: CPT | Performed by: FAMILY MEDICINE

## 2025-06-05 PROCEDURE — 1159F MED LIST DOCD IN RCRD: CPT | Performed by: FAMILY MEDICINE

## 2025-06-05 PROCEDURE — 3008F BODY MASS INDEX DOCD: CPT | Performed by: FAMILY MEDICINE

## 2025-06-05 PROCEDURE — 1125F AMNT PAIN NOTED PAIN PRSNT: CPT

## 2025-06-05 RX ORDER — ALBUTEROL SULFATE 90 UG/1
2 INHALANT RESPIRATORY (INHALATION) EVERY 6 HOURS PRN
Qty: 18 G | Refills: 11 | Status: SHIPPED | OUTPATIENT
Start: 2025-06-05

## 2025-06-05 RX ORDER — PREDNISONE 10 MG/1
TABLET ORAL
Qty: 30 TABLET | Refills: 1 | Status: SHIPPED | OUTPATIENT
Start: 2025-06-05 | End: 2025-06-17

## 2025-06-05 ASSESSMENT — PATIENT HEALTH QUESTIONNAIRE - PHQ9
SUM OF ALL RESPONSES TO PHQ9 QUESTIONS 1 & 2: 0
2. FEELING DOWN, DEPRESSED OR HOPELESS: NOT AT ALL
1. LITTLE INTEREST OR PLEASURE IN DOING THINGS: NOT AT ALL

## 2025-06-05 ASSESSMENT — ENCOUNTER SYMPTOMS
BACK PAIN: 1
FATIGUE: 1
ARTHRALGIAS: 1

## 2025-06-05 ASSESSMENT — PAIN SCALES - GENERAL: PAINLEVEL_OUTOF10: 7

## 2025-06-05 NOTE — PROGRESS NOTES
"Subjective   Patient ID: Eloisa Boogie is a 69 y.o. female who presents for Follow-up (1 month complaint of fatigue, muscle pain, after tick bite).    HPI   Tiredness with joint aches and pains and increased low back pain.  Over the last few months.  Was not necessarily there before they took bite, not necessarily improved on the antibiotic.  Lyme test at the time was negative.  Did follow-up with hematology about her iron deficiency anemia that blood work was okay.  The rest of the other basic checks were okay too.  In about a year for thyroid test but has been stable about 1 the last few years.    Could still be some prolonged issues with Lyme disease, could think about rheumatism.  Also think about viral illness.    Start with the prednisone taper and see if she gets a dramatic improvement in her symptoms.  Think at that better consider doing a rheumatological workup.  If everything else is generally negative could think about relating the Lyme test.  But she did get treatment.  If needs labs will add a TSH.  Can also add all those labs in a couple of months with hematology  Will call in 3 to 4 weeks with an update.    Review of Systems   Constitutional:  Positive for fatigue.   Musculoskeletal:  Positive for arthralgias and back pain.       Objective   /80   Pulse (!) 113   Ht 1.626 m (5' 4\")   Wt 108 kg (237 lb)   LMP  (LMP Unknown)   SpO2 96%   BMI 40.68 kg/m²     Physical Exam  Constitutional:       Appearance: Normal appearance.   HENT:      Head: Normocephalic and atraumatic.   Skin:     General: Skin is warm and dry.   Neurological:      General: No focal deficit present.      Mental Status: She is alert and oriented to person, place, and time.   Psychiatric:         Mood and Affect: Mood normal.         Behavior: Behavior normal.         Thought Content: Thought content normal.         Judgment: Judgment normal.         Assessment/Plan   Problem List Items Addressed This Visit           " ICD-10-CM    Mild persistent asthma without complication ([facility]) J45.30    Relevant Medications    albuterol (Ventolin HFA) 90 mcg/actuation inhaler    Acute midline low back pain without sciatica M54.50    Relevant Medications    predniSONE (Deltasone) 10 mg tablet    Class 3 severe obesity due to excess calories without serious comorbidity with body mass index (BMI) of 40.0 to 44.9 in adult E66.813, Z68.41     Other Visit Diagnoses         Codes      Fatigue, unspecified type    -  Primary R53.83    Relevant Medications    predniSONE (Deltasone) 10 mg tablet

## 2025-06-05 NOTE — PROGRESS NOTES
Pt has a 7/10 lower back pain- pt reports its her normal-chronic issue for pt  Labs at the [facility] the week prior  Rtc 8/5 1100 cnp  Reviewed AVS with patient- patient verbalizes understanding

## 2025-06-05 NOTE — PATIENT INSTRUCTIONS
Discussed and reviewed medical history   Reviewed labs- Iron parameters have improved  Encouraged to continue on oral iron with source of vitamin C  Recommend contacting PCP if symptoms continue or worsening  Return to see APRN in 2 months with labs prior   (CBC w/diff, CMP, Ferritin, Iron Panel, B12, Retic)

## 2025-06-05 NOTE — PROGRESS NOTES
Patient ID: Eloisa Boogie is a 69 y.o. female.    Subjective    HPI    HPI     Patient is a 67 yo female with a PMH of ALCIRA, Anemia, Hiatal Hernia and was referred to benign hematology for consultation of Anemia.     Recent lab work revealed her hg remains low. Patient reports she has struggle with anemia and iron deficiency since her hip replacement in 2016. Patient had a colonoscopy in 2023 to rule out GI bleeding. Coloscopy revealed two polyps, otherwise no significant findings. GI ordered iron infusions, and then started her on oral iron and B12 in hopes to manage iron parameters.       Today, patient presents for initial consultation. Appetite is decent for patient, 1-2 meals on average. Energy is poor/fatigues easily. Since having COVID in 2022  has been struggling with rapid heart beat and increased VELASCO. No abnormal bleeding or bruising. No GI issues. Denies any hematochezia or melena. No urinary issues, no UTIs, dysuria, hematuria. No fevers, night sweats, weight is stable. No recurring infections. No bone pain. Episodes of lightheadedness, and dizziness. Great dentition, left raised rash near elbow, dry skin and hair. No PICA.     Interval History 6/5/25     After iron infusion, had urinary incontinence resolved with medication  Patient is currently being managed by urology for urinary incontinence.   She reports medications and food (sugars) impact her bladder and increase incontinence  Currently it is more manageable, she is not taking anything at this time        BP is elevated at 142/100, she states it is always elevated at doctor's appointment   Recommended home monitoring     Energy is decent, repainting/wall-papering her house, remains working in her gardens  Appetite remains low, tends to eat once a day, unchanged  Uses inhalers as needed, allergies have been more manageable lately  Denies any abnormal bleeding or bruising  No GI issues, no hematuria or melena   Continues on a probiotic, working well  "  No recent falls  Chronic Myalgias/Arthralgias     Remains on a vitamin with B12/Iron/Folate/Vitamin C   States she is feeling well today  No new clinical concerns      up to date on cancer screenings     Objective  BSA: There is no height or weight on file to calculate BSA.  LMP  (LMP Unknown)   Objective    BSA: There is no height or weight on file to calculate BSA.  LMP  (LMP Unknown)      Physical Exam    Performance Status:  Asymptomatic      Assessment/Plan      According to our records patient has been anemic since 4/14/22 with a Hg ranging from 9.1-11.6, remainder of CBC has remained stable with the exception of 4/14/22- both WBC (13.2) and plts (471) were elevated. Since 2022, patients iron parameters have ranged as follows- Fe 15-25, Fe sat 3-6%, Ferritin  as of 3/01/24 was 24., B12 stable at 884.     Patient is currently taking oral iron and B12 in hopes to maintain her iron level. Patient reports poor energy/easily fatigued. Appetite is stable for patient. Continues to struggle with rapid heart beat and increased VELASCO. Episodes of dizziness/lightheadedness. Denies any abnormal bleeding or bruising. No GI or urinary issues. Denies any overt bleeding. No constitutional \"B\" symptoms reported.      Discussed continuing or iron vs iron infusions. Patient is currently taking oral iron, with little to no change in her labs. Recommendation at this time is to proceed with IV Iron Infusions. Patient is agreeable.      Discussed cardiac concerns with regard to rapid heart beat and increased VELASCO. Discussed possibly due to iron deficiency, however would consider cardiology evaluation.       Discussed possible etiologies including multifactorial, nutritional deficiencies, anemia of chronic disease, malabsorption, hemopathy, medications, bleeding, malignancy, etc. Will start hematological workup today.      7/19/24:     Iron deficiency anemia:  -Her anemia has corrected. Hg 12.1.  Remains iron deficient. Fe 35, Fe sat " 10%, Ferritin 56, most likely caused by intestinal malabsorption.  - B12 is stable at 801  - Continues on oral supplement with iron (28mg),  B12 (8mcq), Folate (400mcq), Vitamin C (60 mg) daily  Encouraged patient to take 1 tablet twice daily.   - Recommendation repeat course of IV iron sucrose (venofer). I reviewed treatment with patient. Patient is agreeable to proceed accordingly. Orders have been placed, accordingly.  -follow up in 3 months with labs prior to appointment      3/20/25  - Hg is stable at 12.4, remainder of CBC is stable  - Iron parameters have improved Fe 37, Fe Sat 11%, Ferritin 146, B12 747  - remains taking oral B12/Folate and Iron w/Vitamin C, tolerating supplements well  - denies any clinical concerns/symptoms, no abnormal bleeding or bruising,   - discussed reserving IV Iron til she is anemic as it causes increase in urinary incontinence   - will continue to monitor labs, continue on above vitamins as same        Plan:  Discussed and Reviewed medical history  Reviewed labs- Hemoglobin remains stable, iron parameters slightly trended down  Encouraged to stay on oral [place] with extra iron and Multivitamin with Folate/B12/Iron/Vitamin C  Will repeat labs in 3 months (CBC w/diff, CMP, Ferritin, Iron Panel, B12)  Return to see APRN in 6 months with labs prior  (CBC w/diff, CMP, Ferritin, Iron Panel, B12)     I had an extensive discussion with the patient regarding the diagnosis and discussed the plan of therapy, including general considerations regarding side effects and outcomes. Pt understood and gave appropriate teach back about the plan of care. All questions were answered to the patient's satisfaction. The patient is instructed to contact us at any time if questions or problems arise. Thank you for the opportunity to participate in the care of this very pleasant patient.     Total time =20 minutes. 50% or more of this time was spent in counseling and/or coordination of care including  reviewing medical history/radiology/labs, examining patient, formulating outlined plan with team, and discussing plan with patient/family.               Anushka Snyder, APRN-CNP

## 2025-07-03 DIAGNOSIS — E61.1 IRON DEFICIENCY: ICD-10-CM

## 2025-07-03 DIAGNOSIS — G90.A: ICD-10-CM

## 2025-07-03 DIAGNOSIS — E88.09: ICD-10-CM

## 2025-07-03 DIAGNOSIS — R00.0 TACHYCARDIA: ICD-10-CM

## 2025-07-23 ENCOUNTER — PATIENT OUTREACH (OUTPATIENT)
Dept: CARE COORDINATION | Facility: CLINIC | Age: 70
End: 2025-07-23
Payer: COMMERCIAL

## 2025-07-23 DIAGNOSIS — Z12.31 ENCOUNTER FOR SCREENING MAMMOGRAM FOR BREAST CANCER: ICD-10-CM

## 2025-07-29 DIAGNOSIS — D50.8 OTHER IRON DEFICIENCY ANEMIA: ICD-10-CM

## 2025-07-29 DIAGNOSIS — G90.A: ICD-10-CM

## 2025-07-29 DIAGNOSIS — N39.3 STRESS INCONTINENCE IN FEMALE: ICD-10-CM

## 2025-07-29 DIAGNOSIS — R00.0 TACHYCARDIA: ICD-10-CM

## 2025-07-29 DIAGNOSIS — E61.1 IRON DEFICIENCY: ICD-10-CM

## 2025-07-29 DIAGNOSIS — R35.1 NOCTURIA: ICD-10-CM

## 2025-07-29 DIAGNOSIS — E88.09: ICD-10-CM

## 2025-07-29 DIAGNOSIS — E43 UNSPECIFIED SEVERE PROTEIN-CALORIE MALNUTRITION (MULTI): ICD-10-CM

## 2025-08-01 ENCOUNTER — LAB (OUTPATIENT)
Dept: LAB | Facility: HOSPITAL | Age: 70
End: 2025-08-01
Payer: COMMERCIAL

## 2025-08-01 LAB
ALBUMIN SERPL BCP-MCNC: 4.1 G/DL (ref 3.4–5)
ALP SERPL-CCNC: 74 U/L (ref 33–136)
ALT SERPL W P-5'-P-CCNC: 20 U/L (ref 7–45)
ANION GAP SERPL CALC-SCNC: 11 MMOL/L (ref 10–20)
AST SERPL W P-5'-P-CCNC: 16 U/L (ref 9–39)
BASOPHILS # BLD AUTO: 0.05 X10*3/UL (ref 0–0.1)
BASOPHILS NFR BLD AUTO: 0.5 %
BILIRUB SERPL-MCNC: 0.3 MG/DL (ref 0–1.2)
BUN SERPL-MCNC: 18 MG/DL (ref 6–23)
CALCIUM SERPL-MCNC: 10.2 MG/DL (ref 8.6–10.3)
CHLORIDE SERPL-SCNC: 104 MMOL/L (ref 98–107)
CO2 SERPL-SCNC: 29 MMOL/L (ref 21–32)
CREAT SERPL-MCNC: 0.66 MG/DL (ref 0.5–1.05)
EGFRCR SERPLBLD CKD-EPI 2021: >90 ML/MIN/1.73M*2
EOSINOPHIL # BLD AUTO: 0.11 X10*3/UL (ref 0–0.7)
EOSINOPHIL NFR BLD AUTO: 1.2 %
ERYTHROCYTE [DISTWIDTH] IN BLOOD BY AUTOMATED COUNT: 14.8 % (ref 11.5–14.5)
FERRITIN SERPL-MCNC: 34 NG/ML (ref 8–150)
GLUCOSE SERPL-MCNC: 83 MG/DL (ref 74–99)
HCT VFR BLD AUTO: 40 % (ref 36–46)
HGB BLD-MCNC: 12.4 G/DL (ref 12–16)
HGB RETIC QN: 32 PG (ref 28–38)
IMM GRANULOCYTES # BLD AUTO: 0.05 X10*3/UL (ref 0–0.7)
IMM GRANULOCYTES NFR BLD AUTO: 0.5 % (ref 0–0.9)
IMMATURE RETIC FRACTION: 19.8 %
IRON SATN MFR SERPL: 10 % (ref 25–45)
IRON SERPL-MCNC: 38 UG/DL (ref 35–150)
LYMPHOCYTES # BLD AUTO: 2.58 X10*3/UL (ref 1.2–4.8)
LYMPHOCYTES NFR BLD AUTO: 28.2 %
MCH RBC QN AUTO: 27.4 PG (ref 26–34)
MCHC RBC AUTO-ENTMCNC: 31 G/DL (ref 32–36)
MCV RBC AUTO: 88 FL (ref 80–100)
MONOCYTES # BLD AUTO: 0.66 X10*3/UL (ref 0.1–1)
MONOCYTES NFR BLD AUTO: 7.2 %
NEUTROPHILS # BLD AUTO: 5.71 X10*3/UL (ref 1.2–7.7)
NEUTROPHILS NFR BLD AUTO: 62.4 %
NRBC BLD-RTO: 0 /100 WBCS (ref 0–0)
PLATELET # BLD AUTO: 280 X10*3/UL (ref 150–450)
POTASSIUM SERPL-SCNC: 4 MMOL/L (ref 3.5–5.3)
PROT SERPL-MCNC: 6.3 G/DL (ref 6.4–8.2)
RBC # BLD AUTO: 4.53 X10*6/UL (ref 4–5.2)
RETICS #: 0.09 X10*6/UL (ref 0.02–0.11)
RETICS/RBC NFR AUTO: 1.9 % (ref 0.5–2)
SODIUM SERPL-SCNC: 140 MMOL/L (ref 136–145)
TIBC SERPL-MCNC: 376 UG/DL (ref 240–445)
TSH SERPL-ACNC: 2.04 MIU/L (ref 0.44–3.98)
UIBC SERPL-MCNC: 338 UG/DL (ref 110–370)
VIT B12 SERPL-MCNC: 606 PG/ML (ref 211–911)
WBC # BLD AUTO: 9.2 X10*3/UL (ref 4.4–11.3)

## 2025-08-01 PROCEDURE — 88187 FLOWCYTOMETRY/READ 2-8: CPT

## 2025-08-01 PROCEDURE — 88185 FLOWCYTOMETRY/TC ADD-ON: CPT

## 2025-08-01 PROCEDURE — 84443 ASSAY THYROID STIM HORMONE: CPT

## 2025-08-01 PROCEDURE — 83540 ASSAY OF IRON: CPT

## 2025-08-01 PROCEDURE — 83550 IRON BINDING TEST: CPT

## 2025-08-01 PROCEDURE — 85045 AUTOMATED RETICULOCYTE COUNT: CPT

## 2025-08-01 PROCEDURE — 82306 VITAMIN D 25 HYDROXY: CPT

## 2025-08-01 PROCEDURE — 83010 ASSAY OF HAPTOGLOBIN QUANT: CPT

## 2025-08-01 PROCEDURE — 80053 COMPREHEN METABOLIC PANEL: CPT

## 2025-08-01 PROCEDURE — 82728 ASSAY OF FERRITIN: CPT

## 2025-08-01 PROCEDURE — 88184 FLOWCYTOMETRY/ TC 1 MARKER: CPT

## 2025-08-01 PROCEDURE — 82607 VITAMIN B-12: CPT

## 2025-08-01 PROCEDURE — 85025 COMPLETE CBC W/AUTO DIFF WBC: CPT

## 2025-08-05 ENCOUNTER — OFFICE VISIT (OUTPATIENT)
Dept: HEMATOLOGY/ONCOLOGY | Facility: CLINIC | Age: 70
End: 2025-08-05
Payer: COMMERCIAL

## 2025-08-05 VITALS
BODY MASS INDEX: 40.89 KG/M2 | RESPIRATION RATE: 16 BRPM | WEIGHT: 238.2 LBS | HEART RATE: 93 BPM | DIASTOLIC BLOOD PRESSURE: 89 MMHG | TEMPERATURE: 97.5 F | SYSTOLIC BLOOD PRESSURE: 124 MMHG | OXYGEN SATURATION: 95 %

## 2025-08-05 DIAGNOSIS — E43 UNSPECIFIED SEVERE PROTEIN-CALORIE MALNUTRITION (MULTI): ICD-10-CM

## 2025-08-05 DIAGNOSIS — N39.3 STRESS INCONTINENCE IN FEMALE: ICD-10-CM

## 2025-08-05 DIAGNOSIS — R35.1 NOCTURIA: ICD-10-CM

## 2025-08-05 DIAGNOSIS — Z13.1 ENCOUNTER FOR SCREENING FOR DIABETES MELLITUS: ICD-10-CM

## 2025-08-05 DIAGNOSIS — D50.8 OTHER IRON DEFICIENCY ANEMIA: ICD-10-CM

## 2025-08-05 DIAGNOSIS — R53.83 FATIGUE, UNSPECIFIED TYPE: Primary | ICD-10-CM

## 2025-08-05 LAB
25(OH)D3 SERPL-MCNC: 37 NG/ML (ref 30–100)
FLOW CYTOMETRY SPECIALIST REVIEW: NORMAL
HAPTOGLOB SERPL NEPH-MCNC: 105 MG/DL (ref 30–200)
PATH REVIEW, PNH: NORMAL
PNH RESULTS: NEGATIVE

## 2025-08-05 PROCEDURE — 99214 OFFICE O/P EST MOD 30 MIN: CPT

## 2025-08-05 PROCEDURE — 1159F MED LIST DOCD IN RCRD: CPT

## 2025-08-05 PROCEDURE — 1125F AMNT PAIN NOTED PAIN PRSNT: CPT

## 2025-08-05 ASSESSMENT — PAIN SCALES - GENERAL: PAINLEVEL_OUTOF10: 6

## 2025-08-05 NOTE — PROGRESS NOTES
Patient ID: Eloisa Boogie is a 69 y.o. female.    Subjective    HPI     Patient is a 69 yo female with a PMH of ALCIRA, Anemia, Hiatal Hernia and was referred to benign hematology for consultation of Anemia.     Recent lab work revealed her hg remains low. Patient reports she has struggle with anemia and iron deficiency since her hip replacement in 2016. Patient had a colonoscopy in 2023 to rule out GI bleeding. Coloscopy revealed two polyps, otherwise no significant findings. GI ordered iron infusions, and then started her on oral iron and B12 in hopes to manage iron parameters.       Today, patient presents for initial consultation. Appetite is decent for patient, 1-2 meals on average. Energy is poor/fatigues easily. Since having COVID in 2022  has been struggling with rapid heart beat and increased VELASCO. No abnormal bleeding or bruising. No GI issues. Denies any hematochezia or melena. No urinary issues, no UTIs, dysuria, hematuria. No fevers, night sweats, weight is stable. No recurring infections. No bone pain. Episodes of lightheadedness, and dizziness. Great dentition, left raised rash near elbow, dry skin and hair. No PICA.     Interval History 8/5/25  Patient is in office today for routine follow-up. She remains struggling with fatigue. She has been working a lot in her flower beds, carrying and spreading mulch. But just feels like she is more fatigued than normal. She was treated for a tick bit in May with erythromycin, lyme test was negative. She continued to have joint aches/pains in June and was placed on a prednisone taper, that helped while taking. She is frustrated that she continues to gain weight, despite eating very little. Weight today is 238 lbs  Eats 1-2 meals and maybe a snack daily. She does stay hydrated drinking water daily. Discussed the importance of nutrition, she verbalizes understanding. She remains to have shortness of breath, remains using inhalers 1-2 times daily.  Her allergies have  been terrible lately. Heart palpitations daily, unchanged.  She denies any abnormal bleeding or bruising. Chronic joint pain, has improved since taking steroids.     Her iron saturation has trended down to again. She is currently taking oral iron ([place] [employer] with extra iron) daily, and tolerating well. She is reluctant at repeating iron infusions due to increased urinary incontinence. Feraheme caused more severe urinary pain and incontinence for a longer period of time. Would like try  increasing oral iron and dietary intake. Discussed retesting labs in a month.     She denies any abnormal bleeding, bruising or clotting. Occasionally her urine is dark yellow in color, denies any tea/rust color urine. Urinary incontinence has improved, managed by urology and avoiding triggers (such medications and sugar). Patient voices concerns of having PNH, she stated that she has all the symptoms when goggled. PCP has ordered a PNH Profile (which includes a flow cytometry), results are pending. Bowels are regular, continues taking a probiotic daily, no hematochezia or melanic stool. Increase night sweats, states that her hair is soaked in the morning when she wakes up, increased for maybe the past 2 months. Denies any fevers,flu-like symptoms.     up to date on cancer screenings     Objective    BSA: 2.21 meters squared  /89 Comment: manual  Pulse 93   Temp 36.4 °C (97.5 °F) (Temporal)   Resp 16   Wt 108 kg (238 lb 3.2 oz)   LMP  (LMP Unknown)   SpO2 95%   BMI 40.89 kg/m²      Physical Exam  Vitals and nursing note reviewed.   Constitutional:       Appearance: Normal appearance.   HENT:      Head: Normocephalic and atraumatic.      Nose: Nose normal.      Mouth/Throat:      Pharynx: Oropharynx is clear.     Eyes:      General: No scleral icterus.     Extraocular Movements: Extraocular movements intact.      Conjunctiva/sclera: Conjunctivae normal.       Cardiovascular:      Rate and Rhythm: Normal rate and  "regular rhythm.      Pulses: Normal pulses.      Heart sounds: Normal heart sounds.   Pulmonary:      Effort: Pulmonary effort is normal.      Breath sounds: Normal breath sounds.   Abdominal:      Palpations: Abdomen is soft.      Tenderness: There is no abdominal tenderness.     Musculoskeletal:         General: Normal range of motion.      Cervical back: Normal range of motion and neck supple.   Lymphadenopathy:      Cervical: No cervical adenopathy.     Skin:     General: Skin is warm and dry.     Neurological:      General: No focal deficit present.      Mental Status: She is alert and oriented to person, place, and time.     Psychiatric:         Mood and Affect: Mood normal.         Behavior: Behavior normal.         Thought Content: Thought content normal.         Judgment: Judgment normal.         Performance Status:  Symptomatic; fully ambulatory      Assessment/Plan        According to our records patient has been anemic since 4/14/22 with a Hg ranging from 9.1-11.6, remainder of CBC has remained stable with the exception of 4/14/22- both WBC (13.2) and plts (471) were elevated. Since 2022, patients iron parameters have ranged as follows- Fe 15-25, Fe sat 3-6%, Ferritin  as of 3/01/24 was 24., B12 stable at 884.     Patient is currently taking oral iron and B12 in hopes to maintain her iron level. Patient reports poor energy/easily fatigued. Appetite is stable for patient. Continues to struggle with rapid heart beat and increased VELASCO. Episodes of dizziness/lightheadedness. Denies any abnormal bleeding or bruising. No GI or urinary issues. Denies any overt bleeding. No constitutional \"B\" symptoms reported.      Discussed continuing or iron vs iron infusions. Patient is currently taking oral iron, with little to no change in her labs. Recommendation at this time is to proceed with IV Iron Infusions. Patient is agreeable.      Discussed cardiac concerns with regard to rapid heart beat and increased VELASCO. " Discussed possibly due to iron deficiency, however would consider cardiology evaluation.       Discussed possible etiologies including multifactorial, nutritional deficiencies, anemia of chronic disease, malabsorption, hemopathy, medications, bleeding, malignancy, etc. Will start hematological workup today.           Iron deficiency anemia:  -Her anemia has corrected. Hg 12.1.  Remains iron deficient. Fe 35, Fe sat 10%, Ferritin 56, most likely caused by intestinal malabsorption.  - B12 is stable at 801  - Continues on oral supplement with iron (28mg),  B12 (8mcq), Folate (400mcq), Vitamin C (60 mg) daily  Encouraged patient to take 1 tablet twice daily.   - Recommendation repeat course of IV iron sucrose (venofer). I reviewed treatment with patient. Patient is agreeable to proceed accordingly. Orders have been placed, accordingly.  -follow up in 3 months with labs prior to appointment      3/20/25  - Hg is stable at 12.4, remainder of CBC is stable  - Iron parameters have improved Fe 37, Fe Sat 11%, Ferritin 146, B12 747  - remains taking oral B12/Folate and Iron w/Vitamin C, tolerating supplements well  - denies any clinical concerns/symptoms, no abnormal bleeding or bruising,   - discussed reserving IV Iron til she is anemic as it causes increase in urinary incontinence   - will continue to monitor labs, continue on above vitamins as same     8/6/25  -Hg remains corrected/stable at 12.4, remainder of blood lines are stable  - Iron parameters have trended down- Iron Serum 38, Iron Saturation 10%, Ferritin 34  - B12 is stable at 606  - Discussed oral iron vs course of IV Iron, would like to try oral for a short term and retest labs  -encouraged to increase oral iron to 3 tablets daily  - encouraged to continue on oral B12 as same  - repeated hemolysis labs- Haptoglobin 105 (WNL) LDH result pending  - will order Zinc and Copper labs, and vitamin D   - Thyroid (TSH) was WNL  - PNH Profile was negative  - Will consult  with Dr. Vences, Oncologist regarding PNH, if any further testing is required will call patient  - advise following up with PCP regarding chronic fatigue   - patient requested hormone panel, advised to follow-up with PCP   - Discussed repeating Iron labs in 1 month- will call with results and treatment plan   -Return to see APRN in 3 months with repeat labs prior       PLAN:      Discussed and reviewed medical history  Reviewed recent labs- Iron parameters low- Iron Saturation 10%, Iron Serum 34, Ferritin 38  Patient discussed having hormones checked, recommend to discuss further with PCP  Will repeat hemolysis labs- Haptoglobin and LDH  Will check additional nutritional labs zinc, copper, Vitamin D  Will call with labs results  Encouraged to continue on oral iron daily as same  Return to see APRN 3 months with labs prior        I had an extensive discussion with the patient regarding the diagnosis and discussed the plan of therapy, including general considerations regarding side effects and outcomes. Pt understood and gave appropriate teach back about the plan of care. All questions were answered to the patient's satisfaction. The patient is instructed to contact us at any time if questions or problems arise. Thank you for the opportunity to participate in the care of this very pleasant patient.     Total time =30 minutes. 50% or more of this time was spent in counseling and/or coordination of care including reviewing medical history/radiology/labs, examining patient, formulating outlined plan with team, and discussing plan with patient/family.            Anushka Snyder, PACHECO-CNP

## 2025-08-05 NOTE — PATIENT INSTRUCTIONS
Discussed and reviewed medical history  Reviewed recent labs- Iron parameters low- Iron Saturation 10%, Iron Serum 34, Ferritin 38  Patient discussed having hormones checked, recommend to discuss further with PCP  Will repeat hemolysis labs- Haptoglobin and LDH  Will check additional nutritional labs zinc, copper, Vitamin D  Will call with labs results  Encouraged to continue on oral iron daily as same  Return to see APRN 3 months with labs prior

## 2025-08-05 NOTE — PROGRESS NOTES
Pt has labs to get drawn on 8/7  Labs again before next OV  RTC 11/4 1030 CNP visit  Reviewed AVS with patient- patient verbalizes understanding

## 2025-08-07 ENCOUNTER — LAB (OUTPATIENT)
Dept: LAB | Facility: HOSPITAL | Age: 70
End: 2025-08-07
Payer: COMMERCIAL

## 2025-08-07 DIAGNOSIS — E43 UNSPECIFIED SEVERE PROTEIN-CALORIE MALNUTRITION (MULTI): ICD-10-CM

## 2025-08-07 DIAGNOSIS — N39.3 STRESS INCONTINENCE IN FEMALE: ICD-10-CM

## 2025-08-07 DIAGNOSIS — R53.83 FATIGUE, UNSPECIFIED TYPE: ICD-10-CM

## 2025-08-07 DIAGNOSIS — R35.1 NOCTURIA: ICD-10-CM

## 2025-08-07 DIAGNOSIS — D50.8 OTHER IRON DEFICIENCY ANEMIA: ICD-10-CM

## 2025-08-07 DIAGNOSIS — Z13.1 ENCOUNTER FOR SCREENING FOR DIABETES MELLITUS: ICD-10-CM

## 2025-08-07 LAB — LDH SERPL L TO P-CCNC: 160 U/L (ref 84–246)

## 2025-08-07 PROCEDURE — 83615 LACTATE (LD) (LDH) ENZYME: CPT

## 2025-08-07 PROCEDURE — 84630 ASSAY OF ZINC: CPT

## 2025-08-07 PROCEDURE — 82525 ASSAY OF COPPER: CPT

## 2025-08-10 LAB
COPPER SERPL-MCNC: 136.9 UG/DL (ref 80–155)
ZINC SERPL-MCNC: 74.1 UG/DL (ref 60–120)

## 2025-08-18 ENCOUNTER — APPOINTMENT (OUTPATIENT)
Age: 70
End: 2025-08-18
Payer: COMMERCIAL

## 2025-08-20 ENCOUNTER — APPOINTMENT (OUTPATIENT)
Age: 70
End: 2025-08-20
Payer: COMMERCIAL

## 2025-09-09 ENCOUNTER — APPOINTMENT (OUTPATIENT)
Age: 70
End: 2025-09-09
Payer: COMMERCIAL

## 2025-09-18 ENCOUNTER — APPOINTMENT (OUTPATIENT)
Dept: HEMATOLOGY/ONCOLOGY | Facility: CLINIC | Age: 70
End: 2025-09-18
Payer: COMMERCIAL

## 2025-11-04 ENCOUNTER — APPOINTMENT (OUTPATIENT)
Dept: HEMATOLOGY/ONCOLOGY | Facility: CLINIC | Age: 70
End: 2025-11-04
Payer: COMMERCIAL